# Patient Record
Sex: MALE | Race: WHITE | NOT HISPANIC OR LATINO | Employment: OTHER | URBAN - METROPOLITAN AREA
[De-identification: names, ages, dates, MRNs, and addresses within clinical notes are randomized per-mention and may not be internally consistent; named-entity substitution may affect disease eponyms.]

---

## 2017-01-02 ENCOUNTER — PRE VISIT (OUTPATIENT)
Dept: CARDIOLOGY | Facility: CLINIC | Age: 37
End: 2017-01-02

## 2017-01-04 ENCOUNTER — TELEPHONE (OUTPATIENT)
Dept: TRANSPLANT | Facility: CLINIC | Age: 37
End: 2017-01-04

## 2017-01-04 NOTE — TELEPHONE ENCOUNTER
Jonatan called to let me know he missed his appointment with Dr Calles this morning for his cardiac clearance. There was a family emergency. Jonatan spoke with Dr Calles's staff to reschedule and he will let me know when he has completed his visit.

## 2017-01-05 ENCOUNTER — TELEPHONE (OUTPATIENT)
Dept: CARDIOLOGY | Facility: CLINIC | Age: 37
End: 2017-01-05

## 2017-01-05 NOTE — TELEPHONE ENCOUNTER
----- Message from Crys Gerardo RN sent at 1/5/2017 11:51 AM CST -----  Regarding: FW: PT CALL BACK - VAN  Contact: [phone]  Can you find out what this margarito needs/wants.  I am in clinic al day at [facility].      THanks    Crys  ----- Message -----     From: Jonatan Nick     Sent: 1/4/2017   1:06 PM       To: Crys Gerardo RN  Subject: PT CALL BACK - VAN                          Pt called and would like a call back to discuss today's appointment, as well as getting another date to come in for an appointment.    Best call back: [phone]    Thanks,  DL    Please DO NOT send this message and/or reply back to sender.  Call Center Representatives DO NOT respond to messages.

## 2017-01-05 NOTE — TELEPHONE ENCOUNTER
Returned call to patient and left voice message encouraging him to return call and speak with a cardiology triage RN regarding his questions. Telephone number provided.

## 2017-03-23 ENCOUNTER — PRE VISIT (OUTPATIENT)
Dept: CARDIOLOGY | Facility: CLINIC | Age: 37
End: 2017-03-23

## 2017-04-04 ENCOUNTER — TELEPHONE (OUTPATIENT)
Dept: TRANSPLANT | Facility: CLINIC | Age: 37
End: 2017-04-04

## 2017-04-04 NOTE — TELEPHONE ENCOUNTER
Spoke to Jonatan about his missed appointments with Dr Calles. He missed his January and March appointments. He told me he had family emergencies. I asked him if this happened both times. He responded his kids were sick and he had no . He did say he knows Dr Calles is very busy. I asked him if he realized we are at a standstill and cannot move forward without him getting clearance from Dr Calles. He said he understood and said he would reschedule AND attend his appointment. He does have the cardiology scheduling number.

## 2017-05-10 ENCOUNTER — PRE VISIT (OUTPATIENT)
Dept: CARDIOLOGY | Facility: CLINIC | Age: 37
End: 2017-05-10

## 2017-06-05 ENCOUNTER — TELEPHONE (OUTPATIENT)
Dept: TRANSPLANT | Facility: CLINIC | Age: 37
End: 2017-06-05

## 2017-06-05 NOTE — TELEPHONE ENCOUNTER
Left message on mobile and home phone asking for a return call. Jonatan has missed his appointment with Dr Calles for the 3rd time. No call and no show. I am asking Jonatan what his plans are???

## 2017-06-19 ENCOUNTER — TELEPHONE (OUTPATIENT)
Dept: TRANSPLANT | Facility: CLINIC | Age: 37
End: 2017-06-19

## 2017-06-19 NOTE — TELEPHONE ENCOUNTER
Reached Jonatan and asked him what his plans are since he missed his appointment with Dr Calles for the 3rd time. He said he called them yesterday which would have been Sunday to reschedule. I asked him why he keeps missing his appointments and told me he forgets to put his appointments in his phone as a reminder. In the past his excuse was he did not have a  for his children. I told him without Dr Calles's approval we cannot move forward. I asked him to call me when he gets this appointment rescheduled. I will give him one month and if he does not reschedule I will put him on the agenda to close his evaluation.

## 2017-06-19 NOTE — TELEPHONE ENCOUNTER
Jonatan called me back to tell me he has an appointment with Dr Calles on June 30. He promised to keep that appointment.

## 2017-06-27 ENCOUNTER — PRE VISIT (OUTPATIENT)
Dept: CARDIOLOGY | Facility: CLINIC | Age: 37
End: 2017-06-27

## 2017-06-27 DIAGNOSIS — R06.09 DYSPNEA ON EXERTION: Primary | ICD-10-CM

## 2017-06-27 NOTE — TELEPHONE ENCOUNTER
Pulmonary Hypertension Return Patient Form       Patient Name:  Jonatan Foy       Age: 36M [birth year]   Todays Provider: Stevan    Appt: 6/30/2017             PH Medications: N/A                  Todays Testing: Labs                  Patient Update: Pt was last seen on 11/16/16 with Dr. Calles.  Since that time he was scheduled to have a R/LHC with Angio.  He was scheduled for 3 different follow up appointments (1/4/17, 3/28, 5/24) which he No Showed to.  He has a history of collagen vascular disease, coagulopathy, liver disease, congenital heart disease, drug abuse, alcohol abuse, DVT or PE.             Recent Testing Prior to Appointment:        Date Test Results        12/21/2016 RHC RA: 16, 17, 16 PA: 60/27, 38 PVR: 224 (2.8)     RV: 58, 17 PAW: 24, 38, 23 COI: 6.5 (3.3)   12/21/2016 Angiogram/   Stress Test No Angiographic evidence of obstructive CAD.  LVEDP of 23 mmHg and no evidence of aortic stenosis.

## 2017-06-30 ENCOUNTER — OFFICE VISIT (OUTPATIENT)
Dept: CARDIOLOGY | Facility: CLINIC | Age: 37
End: 2017-06-30
Attending: INTERNAL MEDICINE
Payer: MEDICARE

## 2017-06-30 VITALS
HEART RATE: 71 BPM | HEIGHT: 67 IN | SYSTOLIC BLOOD PRESSURE: 122 MMHG | OXYGEN SATURATION: 98 % | BODY MASS INDEX: 28.28 KG/M2 | WEIGHT: 180.2 LBS | DIASTOLIC BLOOD PRESSURE: 67 MMHG

## 2017-06-30 DIAGNOSIS — I27.20 PULMONARY HYPERTENSION (H): Primary | ICD-10-CM

## 2017-06-30 DIAGNOSIS — R06.09 DYSPNEA ON EXERTION: ICD-10-CM

## 2017-06-30 LAB
ANION GAP SERPL CALCULATED.3IONS-SCNC: 9 MMOL/L (ref 3–14)
BUN SERPL-MCNC: 28 MG/DL (ref 7–30)
CALCIUM SERPL-MCNC: 9 MG/DL (ref 8.5–10.1)
CHLORIDE SERPL-SCNC: 98 MMOL/L (ref 94–109)
CO2 SERPL-SCNC: 29 MMOL/L (ref 20–32)
CREAT SERPL-MCNC: 6.12 MG/DL (ref 0.66–1.25)
ERYTHROCYTE [DISTWIDTH] IN BLOOD BY AUTOMATED COUNT: 14.8 % (ref 10–15)
GFR SERPL CREATININE-BSD FRML MDRD: 10 ML/MIN/1.7M2
GLUCOSE SERPL-MCNC: 134 MG/DL (ref 70–99)
HCT VFR BLD AUTO: 35.4 % (ref 40–53)
HGB BLD-MCNC: 11.5 G/DL (ref 13.3–17.7)
MCH RBC QN AUTO: 33.7 PG (ref 26.5–33)
MCHC RBC AUTO-ENTMCNC: 32.5 G/DL (ref 31.5–36.5)
MCV RBC AUTO: 104 FL (ref 78–100)
NT-PROBNP SERPL-MCNC: ABNORMAL PG/ML (ref 0–125)
PLATELET # BLD AUTO: 161 10E9/L (ref 150–450)
POTASSIUM SERPL-SCNC: 4.4 MMOL/L (ref 3.4–5.3)
RBC # BLD AUTO: 3.41 10E12/L (ref 4.4–5.9)
SODIUM SERPL-SCNC: 136 MMOL/L (ref 133–144)
WBC # BLD AUTO: 4.5 10E9/L (ref 4–11)

## 2017-06-30 PROCEDURE — 80048 BASIC METABOLIC PNL TOTAL CA: CPT | Performed by: INTERNAL MEDICINE

## 2017-06-30 PROCEDURE — 85027 COMPLETE CBC AUTOMATED: CPT | Performed by: INTERNAL MEDICINE

## 2017-06-30 PROCEDURE — 99213 OFFICE O/P EST LOW 20 MIN: CPT | Mod: ZF

## 2017-06-30 PROCEDURE — 99214 OFFICE O/P EST MOD 30 MIN: CPT | Mod: ZP | Performed by: INTERNAL MEDICINE

## 2017-06-30 PROCEDURE — 83880 ASSAY OF NATRIURETIC PEPTIDE: CPT | Performed by: INTERNAL MEDICINE

## 2017-06-30 PROCEDURE — 36415 COLL VENOUS BLD VENIPUNCTURE: CPT | Performed by: INTERNAL MEDICINE

## 2017-06-30 RX ORDER — LIDOCAINE 40 MG/G
CREAM TOPICAL
Status: CANCELLED | OUTPATIENT
Start: 2017-06-30

## 2017-06-30 ASSESSMENT — PAIN SCALES - GENERAL: PAINLEVEL: NO PAIN (0)

## 2017-06-30 NOTE — MR AVS SNAPSHOT
After Visit Summary   6/30/2017    Jonatan Forbes    MRN: [id number]           Patient Information     Date Of Birth          1980        Visit Information        Provider Department      6/30/2017 2:30 PM Roberto Calles MD [facility]        Today's Diagnoses     Pulmonary hypertension (H)    -  1      Care Instructions    You were seen at the [facility] clinic today.  You saw Dr. Calles  Here are your Instructions:    1.  Right heart cath at your convenience.  2.  Follow up after.      Dominga Hood RN  Nurse Care Coordinator  Office:  [phone] option #1, then #3 & ask for Dominga (nurse line)  Fax:  [phone]  After Hours:  [phone]  Appointments:  [phone] option #1, then option #1    Results for JONATAN FORBES (MRN [id number]) as of 6/30/2017 16:17   Ref. Range 6/30/2017 14:01   Sodium Latest Ref Range: 133 - 144 mmol/L 136   Potassium Latest Ref Range: 3.4 - 5.3 mmol/L 4.4   Chloride Latest Ref Range: 94 - 109 mmol/L 98   Carbon Dioxide Latest Ref Range: 20 - 32 mmol/L 29   Urea Nitrogen Latest Ref Range: 7 - 30 mg/dL 28   Creatinine Latest Ref Range: 0.66 - 1.25 mg/dL 6.12 (H)   GFR Estimate Latest Ref Range: >60 mL/min/1.7m2 10 (L)   GFR Estimate If Black Latest Ref Range: >60 mL/min/1.7m2 13 (L)   Calcium Latest Ref Range: 8.5 - 10.1 mg/dL 9.0   Anion Gap Latest Ref Range: 3 - 14 mmol/L 9   N-Terminal Pro Bnp Latest Ref Range: 0 - 125 pg/mL 32651 (H)   Glucose Latest Ref Range: 70 - 99 mg/dL 134 (H)   WBC Latest Ref Range: 4.0 - 11.0 10e9/L 4.5   Hemoglobin Latest Ref Range: 13.3 - 17.7 g/dL 11.5 (L)   Hematocrit Latest Ref Range: 40.0 - 53.0 % 35.4 (L)   Platelet Count Latest Ref Range: 150 - 450 10e9/L 161   RBC Count Latest Ref Range: 4.4 - 5.9 10e12/L 3.41 (L)   MCV Latest Ref Range: 78 - 100 fl 104 (H)   MCH Latest Ref Range: 26.5 - 33.0 pg 33.7 (H)   MCHC Latest Ref Range: 31.5 - 36.5 g/dL 32.5   RDW Latest Ref  Range: 10.0 - 15.0 % 14.8                       Follow-ups after your visit        Your next 10 appointments already scheduled     Jul 19, 2017  1:30 PM CDT   Procedure - 2.5 hour with U2A ROOM 9   [address] [facility] ([facility])    [address] [address]               Jul 19, 2017  2:30 PM CDT   Heart Cath Right Heart Cath with [id number]   [facility] [place]  Heart Cath Lab ([facility])    [address] [id number] [phone]            Aug 02, 2017 12:00 PM CDT   (Arrive by 11:45 AM)   RETURN PRIMARY PULMONARY with Roberto Calles MD   [facility] ([facility])    [address] [place] [id number] [phone]              Future tests that were ordered for you today     Open Future Orders        Priority Expected Expires Ordered    Heart Cath Right heart cath Routine  6/30/2018 6/30/2017            Who to contact     If you have questions or need follow up information about today's clinic visit or your schedule please contact [facility] directly at [phone].  Normal or non-critical lab and imaging results will be communicated to you by Briggohart, letter or phone within 4 business days after the clinic has received the results. If you do not hear from us within 7 days, please contact the clinic through Briggohart or phone. If you have a critical or abnormal lab result, we will notify you by phone as soon as possible.  Submit refill requests through [email or web address] or call your pharmacy and they will forward the refill request to us. Please allow 3 business days for your refill to be completed.          Additional Information About Your Visit        [email or web address] Information     [email or web address] gives you secure access to your electronic health record. If you see a primary care provider, you can also send messages to your care team and make  "appointments. If you have questions, please call your primary care clinic.  If you do not have a primary care provider, please call [phone] and they will assist you.        Care EveryWhere ID     This is your Care EveryWhere ID. This could be used by other organizations to access your [place] medical records  [id number]        Your Vitals Were     Pulse Height Pulse Oximetry BMI (Body Mass Index)          71 1.702 m (5' 7\") 98% 28.22 kg/m2         Blood Pressure from Last 3 Encounters:   06/30/17 122/67   12/21/16 (!) 184/98   11/16/16 152/83    Weight from Last 3 Encounters:   06/30/17 81.7 kg (180 lb 3.2 oz)   11/16/16 86.2 kg (190 lb)   06/24/15 84.2 kg (185 lb 9.6 oz)               Primary Care Provider    Physician No Ref-Primary       No address on file        Equal Access to Services     ROSY BRAY : Hadii salomon yanceyo Somaki, waaxda luqadaha, qaybta kaalmada adeegyada, anuel mcdonough . So [facility] [phone].    ATENCIÓN: Si habla español, tiene a mcarthur disposición servicios gratuitos de asistencia lingüística. Meg al [phone].    We comply with applicable federal civil rights laws and [place] laws. We do not discriminate on the basis of race, color, national origin, age, disability sex, sexual orientation or gender identity.            Thank you!     Thank you for choosing [facility]  for your care. Our goal is always to provide you with excellent care. Hearing back from our patients is one way we can continue to improve our services. Please take a few minutes to complete the written survey that you may receive in the mail after your visit with us. Thank you!             Your Updated Medication List - Protect others around you: Learn how to safely use, store and throw away your medicines at www.disposemymeds.org.          This list is accurate as of: 6/30/17  4:25 PM.  Always use your most recent med list.                   Brand Name Dispense Instructions " for use Diagnosis    calcium acetate 667 MG Caps capsule    PHOSLO     Take 667 mg by mouth 3 times daily (with meals) Take 4 tabs with meals and 2 with snacks        lisinopril 40 MG tablet    PRINIVIL/ZESTRIL     Take 40 mg by mouth        metoprolol 100 MG tablet    LOPRESSOR     ONE TABLET TWICE DAILY        minoxidil 2.5 MG tablet    LONITEN     Take 2.5 mg by mouth 2 times daily        NIFEdipine ER 90 MG Tb24    ADALAT CC     Take 90 mg by mouth daily        oxyCODONE-acetaminophen 5-325 MG per tablet    PERCOCET     Take by mouth every 4 hours as needed for moderate to severe pain

## 2017-06-30 NOTE — PROGRESS NOTES
Problem List  1. CKD  2. Pulmonary hypertension with elevated LVEDP  3. ASHD without signficant obstruction  4. Hypertension  5. Anemia     History    Patient returns for follow-up.  Dry weight apparently reset.  No hypotension by report, otherwise feeling well.    Wt Readings from Last 24 Encounters:   06/30/17 81.7 kg (180 lb 3.2 oz)   11/16/16 86.2 kg (190 lb)   06/24/15 84.2 kg (185 lb 9.6 oz)   06/14/11 112.9 kg (249 lb)   10/13/10 115.6 kg (254 lb 14.4 oz)   10/04/10 115.2 kg (254 lb)   06/23/10 117.2 kg (258 lb 6.4 oz)   04/15/08 120.4 kg (265 lb 8 oz)   11/01/07 112.9 kg (249 lb)   10/12/07 116.1 kg (256 lb)     REVIEW of SYMPTOMS    Constitutional: without fever, chills, night sweats.  Weight is down  HEENT: without dry eyes, dry mouth, sinusitis, corryza, visual changes  Endocrine: without polyuria, polydypsia, polyphagia, heat or cold intolerance, changing mental status  Cardiology: without chest pain, tightness, heaviness, pressure, paroxysmal dyspnea, orthopnea, palpitation, pre-syncope or syncope or device discharge (if present)  Pulmonary: without asthma, wheezing, cough, hemoptysis  GI: without nausea, emesis, jaundice, pain, hematemesis, melena  : without frequency, urgency, hematuria, stones, pain, abnormal bleeding, frequency, urgency  Neurologic: without TIA, CVA, trauma, seizure  Dermatologic: without lesions, abrasion rash,   Orthopedic/Rheum: without significant joint pain, impairment, limb, polyserositis, ulceration, Raynauds  Heme: without mass, bruising, frequent infection, anemia  Psychiatric: without substance abuse, hallucination, medication, depression    Exercise tolerance:.     Objective   Constitutional: alert, oriented, normal gait and station, normal mentation.  Oral: moist mucous membrans  Lymph: without pathologic adenopathy  Chest: clear to ausculation and percussion  Cor: No evidence of left or right ventricular activity.  Rhythm is regular.  S1 normal, S2 split  physiologically. Murmurs are not present  Abdomen: without tenderness, rebound, guarding, masses, ascites  Extremities: Edema not present, fistula left arm, large with bruit  Neuro: no focal defects, normal mentation  Skin: without open lesions  Psych: oriented, verbal, mental status in tact      Wt Readings from Last 5 Encounters:   11/16/16 86.2 kg (190 lb)   06/24/15 84.2 kg (185 lb 9.6 oz)   06/14/11 112.9 kg (249 lb)   10/13/10 115.6 kg (254 lb 14.4 oz)   10/04/10 115.2 kg (254 lb)         Meds  Current Outpatient Prescriptions   Medication     minoxidil (LONITEN) 2.5 MG tablet     NIFEdipine (ADALAT CC) 90 MG TB24     lisinopril (PRINIVIL,ZESTRIL) 40 MG tablet     calcium acetate (PHOSLO) 667 MG CAPS     oxyCODONE-acetaminophen (PERCOCET) 5-325 MG per tablet     METOPROLOL TARTRATE 100 MG PO TABS      No current facility-administered medications for this visit.          Labs   Results for GABRIEL FORBES JONI (MRN [id number]) as of 6/30/2017 15:53   Ref. Range 12/21/2016 15:43 6/30/2017 14:01   Sodium Latest Ref Range: 133 - 144 mmol/L  136   Potassium Latest Ref Range: 3.4 - 5.3 mmol/L  4.4   Chloride Latest Ref Range: 94 - 109 mmol/L  98   Carbon Dioxide Latest Ref Range: 20 - 32 mmol/L  29   Urea Nitrogen Latest Ref Range: 7 - 30 mg/dL  28   Creatinine Latest Ref Range: 0.66 - 1.25 mg/dL  6.12 (H)   GFR Estimate Latest Ref Range: >60 mL/min/1.7m2  10 (L)   GFR Estimate If Black Latest Ref Range: >60 mL/min/1.7m2  13 (L)   Calcium Latest Ref Range: 8.5 - 10.1 mg/dL  9.0   Anion Gap Latest Ref Range: 3 - 14 mmol/L  9   N-Terminal Pro Bnp Latest Ref Range: 0 - 125 pg/mL  35895 (H)   Glucose Latest Ref Range: 70 - 99 mg/dL  134 (H)   WBC Latest Ref Range: 4.0 - 11.0 10e9/L  4.5   Hemoglobin Latest Ref Range: 13.3 - 17.7 g/dL  11.5 (L)   Hematocrit Latest Ref Range: 40.0 - 53.0 %  35.4 (L)   Platelet Count Latest Ref Range: 150 - 450 10e9/L  161   RBC Count Latest Ref Range: 4.4 - 5.9 10e12/L  3.41 (L)   MCV Latest  Ref Range: 78 - 100 fl  104 (H)   MCH Latest Ref Range: 26.5 - 33.0 pg  33.7 (H)   MCHC Latest Ref Range: 31.5 - 36.5 g/dL  32.5   RDW Latest Ref Range: 10.0 - 15.0 %  14.8        Imaging      HEMODYNAMICS:  --HR 78 bpm  --Ao /74/85 mmHg      RIGHT HEART CATHETERIZATION:  BSA 2.03m2  --RA 16/17/16    --RV 58/17  --PA 60/27/38    --PCW 24/38/23    --PA sat 68.4%   --PCW 92.1%  --Arterial sat 98%  --Hgb 9.7 g/dL    --Elizabeht CO 6.5    --Elizabeth CI 3.2    --TD CO 13.3  (moderate TR on TTE)  --TD CI 6.7  (moderate TR on TTE)  --PVR 2.8  --      CORONARY ANGIOGRAM:    --Both coronary arteries arise from their respective cusps.  --Right dominant.  --LM is large without any angiographic evidence of disease.  --LAD is a large type 3 vessel giving rise to septal perforators, a large D1 and small D2 and D3.  There is no angiographic evidence of disease in any segment.  --LCX is a large vessel giving rise to small OM1 and large OM2 and OM3 vessels.  The pLCx has a moderate focal calcification with diffuse minimal luminal irregularities.  The mLCx, dLCx, OM2 and OM3 have no angiographic evidence of disease.     --RCA is a large vessel giving rise to large RPDA and RPL branches.  There is no angiographic evidence of disease.        LEFT HEART CATHETERIZATION:  --LVEDP 23 mmHg.  --No gradient across the aortic valve on pull back.  --Simultaneous pressures with the RV and LV demonstrated concordance in respiratory variation      EXAMINATION: CT ABDOMEN PELVIS W CONTRAST, 7/22/2016 10:41 AM      TECHNIQUE:  Helical CT images from the lung bases through the  symphysis pubis were obtained with IV contrast. Contrast dose: Isovue  370 114cc      COMPARISON: No prior studies available for comparison.      HISTORY: re-assess  mildly enlarged periaortic nodes and left external  iliac chain nodes seen on 2014 outside abominal pelvis CAT scan,  Awaiting organ transplant status, End stage renal disease. History of  failed renal  transplant.      FINDINGS:      Abdomen and pelvis: Postsurgical changes are noted in the left lower  quadrant related to prior renal transplantation. There is a  well-defined hypodense structure measuring 5.2 x 6 cm in the left  lower quadrant medial to the external iliac vasculature demonstrating  internal calcifications, consistent with nonfunctioning transplanted  kidney. Both native kidneys are atrophic with nonobstructing  nephrolithiasis.  The liver demonstrates homogeneous parenchyma without focal lesion.  Spleen, pancreas and both adrenal glands are unremarkable. The  gallbladder is contracted without gallstones or pericholecystic fluid.  No bowel wall thickening or obstruction. Urinary bladder is collapsed.  Multiple mildly enlarged lymph nodes are noted in the retroperitoneum  in a para-aortic location and in the mesentery and pelvis. For example  there is a left para-aortic lymph node measuring 11.5 cm (image 41,  series 2). There is a left common iliac lymph node measuring 1.7 x 1.5  cm (image 51, series 2). Mildly enlarged bilateral inguinal lymph  nodes are also noted, largest measuring 2.5 x 1.3 cm on the left.  Major abdominal vasculature is patent. No free fluid in the abdomen or  pelvis.  Partially visualized  shunt coiled in the lower abdomen.      Lung bases: Minimal right basilar atelectasis.      Bones and soft tissues: Lytic focus in the right iliac bone (series 2  image 54).  Diffuse mild increase density of the osseous structures  consistent with renal osteodystrophy. Multilevel degenerative changes  are noted in the spine.  Small fat-containing umbilical hernia.          IMPRESSION:    1. Nonfunctioning renal transplant in the left lower quadrant with  atrophic native kidneys.  2. Multiple mildly enlarged lymph nodes in the retroperitoneum,  mesentery and pelvis.  These could be reactive or related to a  lymphoproliferative disorder such as PTLD.  Currently, previous CT  scans are  unavailable for comparison, including the 2014 outside  abdomen and pelvis CT mentioned in the procedure comments.  If this  study is made available, an addendum documenting stability could be  provided.  3.  Small, non-aggressive appearing lytic focus in the right iliac  bone, of uncertain clinical significance.  Recommend comparison with  outside films.  4.  Diffuse sclerosis of the bones, and possibly related to renal  Osteodystrophy.             Name: GABRIEL FORBES  MRN: [id number]  : 1980  Study Date: 2016 11:07 AM  Age: 36 yrs  Gender: Male  Patient Location: [facility]  Reason For Study:     Ordering Physician: TYSON SANTACRUZ  Referring Physician: TYSON SANTACRUZ  Performed By: Shanel Rubin RDCS     BSA: 2.0 m2  Height: 67 in  Weight: 185 lb  HR: 73  BP: 151/85 mmHg  ______________________________________________________________________________        Procedure  Echocardiogram with two-dimensional, color and spectral Doppler performed.  ______________________________________________________________________________     Interpretation Summary  Global and regional left ventricular function is normal with an EF of 60-65%.  Mild concentric wall thickening consistent with left ventricular hypertrophy  is present.  Right ventricular function, chamber size, wall motion, and thickness are  normal.  Moderate pulmonary hypertension is present.  No significant change from prior study.    Assessment:  The heart catherization in December demonstrated pulmonary venous hypertension suggesting the patient was volume overloaded and would potential respond to resetting of dry weight per renal and renal transplant.  He should have repeat right heart catherization after dry weight reset.  The other option is to admit with dialysis daily until PCP <10-12.  Renal to decide.  Patient states that dry weight has been reset.  Should have repeat if now at dry weight as continued PH in context of renal disease is very poor  prognostic marker.      Discussed with patient.    CC: [facility] [place]

## 2017-06-30 NOTE — LETTER
6/30/2017      RE: Jonatan Foy  [address] [facility] [address]       Dear Colleague,    Thank you for the opportunity to participate in the care of your patient, Jonatan Foy, at the [facility] at [facility]. Please see a copy of my visit note below.          Problem List  1. CKD  2. Pulmonary hypertension with elevated LVEDP  3. ASHD without signficant obstruction  4. Hypertension  5. Anemia     History    Patient returns for follow-up.  Dry weight apparently reset.  No hypotension by report, otherwise feeling well.    Wt Readings from Last 24 Encounters:   06/30/17 81.7 kg (180 lb 3.2 oz)   11/16/16 86.2 kg (190 lb)   06/24/15 84.2 kg (185 lb 9.6 oz)   06/14/11 112.9 kg (249 lb)   10/13/10 115.6 kg (254 lb 14.4 oz)   10/04/10 115.2 kg (254 lb)   06/23/10 117.2 kg (258 lb 6.4 oz)   04/15/08 120.4 kg (265 lb 8 oz)   11/01/07 112.9 kg (249 lb)   10/12/07 116.1 kg (256 lb)     REVIEW of SYMPTOMS    Constitutional: without fever, chills, night sweats.  Weight is down  HEENT: without dry eyes, dry mouth, sinusitis, corryza, visual changes  Endocrine: without polyuria, polydypsia, polyphagia, heat or cold intolerance, changing mental status  Cardiology: without chest pain, tightness, heaviness, pressure, paroxysmal dyspnea, orthopnea, palpitation, pre-syncope or syncope or device discharge (if present)  Pulmonary: without asthma, wheezing, cough, hemoptysis  GI: without nausea, emesis, jaundice, pain, hematemesis, melena  : without frequency, urgency, hematuria, stones, pain, abnormal bleeding, frequency, urgency  Neurologic: without TIA, CVA, trauma, seizure  Dermatologic: without lesions, abrasion rash,   Orthopedic/Rheum: without significant joint pain, impairment, limb, polyserositis, ulceration, Raynauds  Heme: without mass, bruising, frequent infection, anemia  Psychiatric: without substance abuse, hallucination, medication, depression    Exercise  tolerance:.     Objective   Constitutional: alert, oriented, normal gait and station, normal mentation.  Oral: moist mucous membrans  Lymph: without pathologic adenopathy  Chest: clear to ausculation and percussion  Cor: No evidence of left or right ventricular activity.  Rhythm is regular.  S1 normal, S2 split physiologically. Murmurs are not present  Abdomen: without tenderness, rebound, guarding, masses, ascites  Extremities: Edema not present, fistula left arm, large with bruit  Neuro: no focal defects, normal mentation  Skin: without open lesions  Psych: oriented, verbal, mental status in tact      Wt Readings from Last 5 Encounters:   11/16/16 86.2 kg (190 lb)   06/24/15 84.2 kg (185 lb 9.6 oz)   06/14/11 112.9 kg (249 lb)   10/13/10 115.6 kg (254 lb 14.4 oz)   10/04/10 115.2 kg (254 lb)         Meds      Current Outpatient Prescriptions   Medication     minoxidil (LONITEN) 2.5 MG tablet     NIFEdipine (ADALAT CC) 90 MG TB24     lisinopril (PRINIVIL,ZESTRIL) 40 MG tablet     calcium acetate (PHOSLO) 667 MG CAPS     oxyCODONE-acetaminophen (PERCOCET) 5-325 MG per tablet     METOPROLOL TARTRATE 100 MG PO TABS      No current facility-administered medications for this visit.          Labs   Results for GABRIEL FORBES JONI (MRN [id number]) as of 6/30/2017 15:53   Ref. Range 12/21/2016 15:43 6/30/2017 14:01   Sodium Latest Ref Range: 133 - 144 mmol/L  136   Potassium Latest Ref Range: 3.4 - 5.3 mmol/L  4.4   Chloride Latest Ref Range: 94 - 109 mmol/L  98   Carbon Dioxide Latest Ref Range: 20 - 32 mmol/L  29   Urea Nitrogen Latest Ref Range: 7 - 30 mg/dL  28   Creatinine Latest Ref Range: 0.66 - 1.25 mg/dL  6.12 (H)   GFR Estimate Latest Ref Range: >60 mL/min/1.7m2  10 (L)   GFR Estimate If Black Latest Ref Range: >60 mL/min/1.7m2  13 (L)   Calcium Latest Ref Range: 8.5 - 10.1 mg/dL  9.0   Anion Gap Latest Ref Range: 3 - 14 mmol/L  9   N-Terminal Pro Bnp Latest Ref Range: 0 - 125 pg/mL  96688 (H)   Glucose Latest Ref  Range: 70 - 99 mg/dL  134 (H)   WBC Latest Ref Range: 4.0 - 11.0 10e9/L  4.5   Hemoglobin Latest Ref Range: 13.3 - 17.7 g/dL  11.5 (L)   Hematocrit Latest Ref Range: 40.0 - 53.0 %  35.4 (L)   Platelet Count Latest Ref Range: 150 - 450 10e9/L  161   RBC Count Latest Ref Range: 4.4 - 5.9 10e12/L  3.41 (L)   MCV Latest Ref Range: 78 - 100 fl  104 (H)   MCH Latest Ref Range: 26.5 - 33.0 pg  33.7 (H)   MCHC Latest Ref Range: 31.5 - 36.5 g/dL  32.5   RDW Latest Ref Range: 10.0 - 15.0 %  14.8        Imaging      HEMODYNAMICS:  --HR 78 bpm  --Ao /74/85 mmHg      RIGHT HEART CATHETERIZATION:  BSA 2.03m2  --RA 16/17/16    --RV 58/17  --PA 60/27/38    --PCW 24/38/23    --PA sat 68.4%   --PCW 92.1%  --Arterial sat 98%  --Hgb 9.7 g/dL    --Elizabeth CO 6.5    --Elizabeth CI 3.2    --TD CO 13.3  (moderate TR on TTE)  --TD CI 6.7  (moderate TR on TTE)  --PVR 2.8  --      CORONARY ANGIOGRAM:    --Both coronary arteries arise from their respective cusps.  --Right dominant.  --LM is large without any angiographic evidence of disease.  --LAD is a large type 3 vessel giving rise to septal perforators, a large D1 and small D2 and D3.  There is no angiographic evidence of disease in any segment.  --LCX is a large vessel giving rise to small OM1 and large OM2 and OM3 vessels.  The pLCx has a moderate focal calcification with diffuse minimal luminal irregularities.  The mLCx, dLCx, OM2 and OM3 have no angiographic evidence of disease.     --RCA is a large vessel giving rise to large RPDA and RPL branches.  There is no angiographic evidence of disease.        LEFT HEART CATHETERIZATION:  --LVEDP 23 mmHg.  --No gradient across the aortic valve on pull back.  --Simultaneous pressures with the RV and LV demonstrated concordance in respiratory variation      EXAMINATION: CT ABDOMEN PELVIS W CONTRAST, 7/22/2016 10:41 AM      TECHNIQUE:  Helical CT images from the lung bases through the  symphysis pubis were obtained with IV contrast. Contrast  dose: Isovue  370 114cc      COMPARISON: No prior studies available for comparison.      HISTORY: re-assess  mildly enlarged periaortic nodes and left external  iliac chain nodes seen on 2014 outside abominal pelvis CAT scan,  Awaiting organ transplant status, End stage renal disease. History of  failed renal transplant.      FINDINGS:      Abdomen and pelvis: Postsurgical changes are noted in the left lower  quadrant related to prior renal transplantation. There is a  well-defined hypodense structure measuring 5.2 x 6 cm in the left  lower quadrant medial to the external iliac vasculature demonstrating  internal calcifications, consistent with nonfunctioning transplanted  kidney. Both native kidneys are atrophic with nonobstructing  nephrolithiasis.  The liver demonstrates homogeneous parenchyma without focal lesion.  Spleen, pancreas and both adrenal glands are unremarkable. The  gallbladder is contracted without gallstones or pericholecystic fluid.  No bowel wall thickening or obstruction. Urinary bladder is collapsed.  Multiple mildly enlarged lymph nodes are noted in the retroperitoneum  in a para-aortic location and in the mesentery and pelvis. For example  there is a left para-aortic lymph node measuring 11.5 cm (image 41,  series 2). There is a left common iliac lymph node measuring 1.7 x 1.5  cm (image 51, series 2). Mildly enlarged bilateral inguinal lymph  nodes are also noted, largest measuring 2.5 x 1.3 cm on the left.  Major abdominal vasculature is patent. No free fluid in the abdomen or  pelvis.  Partially visualized  shunt coiled in the lower abdomen.      Lung bases: Minimal right basilar atelectasis.      Bones and soft tissues: Lytic focus in the right iliac bone (series 2  image 54).  Diffuse mild increase density of the osseous structures  consistent with renal osteodystrophy. Multilevel degenerative changes  are noted in the spine.  Small fat-containing umbilical hernia.          IMPRESSION:     1. Nonfunctioning renal transplant in the left lower quadrant with  atrophic native kidneys.  2. Multiple mildly enlarged lymph nodes in the retroperitoneum,  mesentery and pelvis.  These could be reactive or related to a  lymphoproliferative disorder such as PTLD.  Currently, previous CT  scans are unavailable for comparison, including the  outside  abdomen and pelvis CT mentioned in the procedure comments.  If this  study is made available, an addendum documenting stability could be  provided.  3.  Small, non-aggressive appearing lytic focus in the right iliac  bone, of uncertain clinical significance.  Recommend comparison with  outside films.  4.  Diffuse sclerosis of the bones, and possibly related to renal  Osteodystrophy.             Name: GABRIEL FORBES  MRN: [id number]  : 1980  Study Date: 2016 11:07 AM  Age: 36 yrs  Gender: Male  Patient Location: [facility] [place] [facility]  Reason For Study:     Ordering Physician: TYSON SANTACRUZ  Referring Physician: TYSON SANTACRUZ  Performed By: Shanel Rubin RDCS     BSA: 2.0 m2  Height: 67 in  Weight: 185 lb  HR: 73  BP: 151/85 mmHg  ______________________________________________________________________________        Procedure  Echocardiogram with two-dimensional, color and spectral Doppler performed.  ______________________________________________________________________________     Interpretation Summary  Global and regional left ventricular function is normal with an EF of 60-65%.  Mild concentric wall thickening consistent with left ventricular hypertrophy  is present.  Right ventricular function, chamber size, wall motion, and thickness are  normal.  Moderate pulmonary hypertension is present.  No significant change from prior study.    Assessment:  The heart catherization in December demonstrated pulmonary venous hypertension suggesting the patient was volume overloaded and would potential respond to resetting of dry weight per renal and renal transplant.  He  should have repeat right heart catherization after dry weight reset.  The other option is to admit with dialysis daily until PCP <10-12.  Renal to decide.  Patient states that dry weight has been reset.  Should have repeat if now at dry weight as continued PH in context of renal disease is very poor prognostic marker.      Discussed with patient.     Roberto Calles MD      CC: [facility] [place]

## 2017-06-30 NOTE — PATIENT INSTRUCTIONS
You were seen at the [facility] clinic today.  You saw Dr. Calles  Here are your Instructions:    1.  Right heart cath at your convenience.  2.  Follow up after.      Dominga Hood RN  Nurse Care Coordinator  Office:  [phone] option #1, then #3 & ask for Dominga (nurse line)  Fax:  [phone]  After Hours:  [phone]  Appointments:  [phone] option #1, then option #1    Results for GABRIEL FORBES (MRN [id number]) as of 6/30/2017 16:17   Ref. Range 6/30/2017 14:01   Sodium Latest Ref Range: 133 - 144 mmol/L 136   Potassium Latest Ref Range: 3.4 - 5.3 mmol/L 4.4   Chloride Latest Ref Range: 94 - 109 mmol/L 98   Carbon Dioxide Latest Ref Range: 20 - 32 mmol/L 29   Urea Nitrogen Latest Ref Range: 7 - 30 mg/dL 28   Creatinine Latest Ref Range: 0.66 - 1.25 mg/dL 6.12 (H)   GFR Estimate Latest Ref Range: >60 mL/min/1.7m2 10 (L)   GFR Estimate If Black Latest Ref Range: >60 mL/min/1.7m2 13 (L)   Calcium Latest Ref Range: 8.5 - 10.1 mg/dL 9.0   Anion Gap Latest Ref Range: 3 - 14 mmol/L 9   N-Terminal Pro Bnp Latest Ref Range: 0 - 125 pg/mL 96521 (H)   Glucose Latest Ref Range: 70 - 99 mg/dL 134 (H)   WBC Latest Ref Range: 4.0 - 11.0 10e9/L 4.5   Hemoglobin Latest Ref Range: 13.3 - 17.7 g/dL 11.5 (L)   Hematocrit Latest Ref Range: 40.0 - 53.0 % 35.4 (L)   Platelet Count Latest Ref Range: 150 - 450 10e9/L 161   RBC Count Latest Ref Range: 4.4 - 5.9 10e12/L 3.41 (L)   MCV Latest Ref Range: 78 - 100 fl 104 (H)   MCH Latest Ref Range: 26.5 - 33.0 pg 33.7 (H)   MCHC Latest Ref Range: 31.5 - 36.5 g/dL 32.5   RDW Latest Ref Range: 10.0 - 15.0 % 14.8

## 2017-06-30 NOTE — NURSING NOTE
Chief Complaint   Patient presents with     Follow Up For     Return for PH F/U with Labs prior     Vitals were taken and medications were reconciled.     Garth Kuo, SHAVON  2:09 PM

## 2017-07-19 ENCOUNTER — APPOINTMENT (OUTPATIENT)
Dept: MEDSURG UNIT | Facility: CLINIC | Age: 37
End: 2017-07-19
Attending: INTERNAL MEDICINE
Payer: MEDICARE

## 2017-07-19 ENCOUNTER — APPOINTMENT (OUTPATIENT)
Dept: CARDIOLOGY | Facility: CLINIC | Age: 37
End: 2017-07-19
Attending: INTERNAL MEDICINE
Payer: MEDICARE

## 2017-07-19 ENCOUNTER — HOSPITAL ENCOUNTER (OUTPATIENT)
Facility: CLINIC | Age: 37
Discharge: HOME | End: 2017-07-19
Attending: INTERNAL MEDICINE | Admitting: INTERNAL MEDICINE
Payer: MEDICARE

## 2017-07-19 VITALS
TEMPERATURE: 98 F | DIASTOLIC BLOOD PRESSURE: 104 MMHG | HEIGHT: 67 IN | HEART RATE: 108 BPM | BODY MASS INDEX: 27.94 KG/M2 | RESPIRATION RATE: 20 BRPM | WEIGHT: 178 LBS | SYSTOLIC BLOOD PRESSURE: 178 MMHG | OXYGEN SATURATION: 100 %

## 2017-07-19 DIAGNOSIS — I27.20 PULMONARY HYPERTENSION (H): ICD-10-CM

## 2017-07-19 PROCEDURE — 93451 RIGHT HEART CATH: CPT

## 2017-07-19 PROCEDURE — 27210982 ZZH KIT RT HC TOTES DISP CR7

## 2017-07-19 PROCEDURE — 27211181 ZZH BALLOON TIP PRESSURE CR5

## 2017-07-19 PROCEDURE — 40000166 ZZH STATISTIC PP CARE STAGE 1

## 2017-07-19 PROCEDURE — 27210788 ZZH MANIFOLD CR3

## 2017-07-19 PROCEDURE — 27210806 ZZH SHEATH CR5

## 2017-07-19 PROCEDURE — 93451 RIGHT HEART CATH: CPT | Mod: 26 | Performed by: INTERNAL MEDICINE

## 2017-07-19 PROCEDURE — 4A023N6 MEASUREMENT OF CARDIAC SAMPLING AND PRESSURE, RIGHT HEART, PERCUTANEOUS APPROACH: ICD-10-PCS | Performed by: INTERNAL MEDICINE

## 2017-07-19 RX ORDER — LIDOCAINE 40 MG/G
CREAM TOPICAL
Status: COMPLETED | OUTPATIENT
Start: 2017-07-19 | End: 2017-07-19

## 2017-07-19 RX ADMIN — LIDOCAINE: 40 CREAM TOPICAL at 14:16

## 2017-07-19 NOTE — IP AVS SNAPSHOT
MRN:[id number]                      After Visit Summary   7/19/2017    Jonatan Foy    MRN: [id number]           Visit Information        Department      7/19/2017  1:16 PM [facility], [place],  Heart Cath Lab          Review of your medicines      UNREVIEWED medicines. Ask your doctor about these medicines        Dose / Directions    lisinopril 40 MG tablet   Commonly known as:  PRINIVIL/ZESTRIL        Dose:  40 mg   Take 40 mg by mouth   Refills:  0       metoprolol 100 MG tablet   Commonly known as:  LOPRESSOR        ONE TABLET TWICE DAILY   Refills:  3       minoxidil 2.5 MG tablet   Commonly known as:  LONITEN        Dose:  2.5 mg   Take 2.5 mg by mouth 2 times daily   Refills:  0       NIFEdipine ER 90 MG Tb24   Commonly known as:  ADALAT CC        Dose:  90 mg   Take 90 mg by mouth daily   Refills:  0       oxyCODONE-acetaminophen 5-325 MG per tablet   Commonly known as:  PERCOCET        Take by mouth every 4 hours as needed for moderate to severe pain   Refills:  0                Protect others around you: Learn how to safely use, store and throw away your medicines at www.disposemymeds.org.         Follow-ups after your visit        Your next 10 appointments already scheduled     Aug 02, 2017 12:00 PM CDT   (Arrive by 11:45 AM)   RETURN PRIMARY PULMONARY with Roberto Calles MD   [facility] ([facility]    [address] [place] [id number] [phone]               Care Instructions        Further instructions from your care team       [facility]                        Interventional Cardiology  Discharge Instructions   Post Right Heart Cath      AFTER YOU GO HOME:    DO drink plenty of fluids    DO resume your regular diet and medications unless otherwise instructed by your Primary Physician    Do Not scrub the procedure site vigorously    No lotion or powder to the puncture site for 3 days    CALL YOUR  PRIMARY PHYSICIAN IF: You may resume all normal activity.  Monitor neck site for bleeding, swelling, or voice changes. If you notice bleeding or swelling immediately apply pressure to the site and call number below to speak with Cardiology Fellow.  If you experience any changes in your breathing you should call your doctor immediately or come to the closest Emergency Department.  Do not drive yourself.    ADDITIONAL INSTRUCTIONS: Medications: You are to resume all home medications including anticoagulation therapy unless otherwise advised by your primary cardiologist or nurse coordinator.    Follow Up: Per your primary cardiology team    If you have any questions or concerns regarding your procedure site please call [phone] at anytime and ask for Cardiology Fellow on call.  They are available 24 hours a day.  You may also contact the Cardiology Clinic after hours number at [phone].                                                       Telephone Numbers [phone] Monday-Friday 8:00 am to 4:30 pm    [phone] After 4:30 pm Monday-Friday, Weekends & Holidays  Ask for Interventional Cardiologist on call. Someone is on call 24 hours/day   [facility] toll free number [phone] Monday-Friday 8:00 am to 4:30 pm   [facility] Emergency Dept [phone]                    Additional Information About Your Visit        [employer] Information     [employer] gives you secure access to your electronic health record. If you see a primary care provider, you can also send messages to your care team and make appointments. If you have questions, please call your primary care clinic.  If you do not have a primary care provider, please call [phone] and they will assist you.        Care EveryWhere ID     This is your Care EveryWhere ID. This could be used by other organizations to access your Portal medical records  [id number]        Your Vitals Were     Blood Pressure Pulse Temperature Respirations Height  "Weight    158/94 (BP Location: Right arm) 80 97.8  F (36.6  C) (Oral) 20 1.702 m (5' 7\") 80.7 kg (178 lb)    Pulse Oximetry BMI (Body Mass Index)                100% 27.88 kg/m2           Primary Care Provider    Physician No Ref-Primary      Equal Access to Services     MAXIMINOPATRICIA KATARINA : Hadii aad ku hadasho Soomaali, waaxda luqadaha, qaybta kaalmada adeegyada, waxjosé nitinin hayaan adewilbert aguilar laheidilianne . So [facility] [phone].    ATENCIÓN: Si habla español, tiene a mcarthur disposición servicios gratuitos de asistencia lingüística. Llame al [phone].    We comply with applicable federal civil rights laws and [place] laws. We do not discriminate on the basis of race, color, national origin, age, disability sex, sexual orientation or gender identity.            Thank you!     Thank you for choosing [facility] for your care. Our goal is always to provide you with excellent care. Hearing back from our patients is one way we can continue to improve our services. Please take a few minutes to complete the written survey that you may receive in the mail after you visit with us. Thank you!             Medication List: This is a list of all your medications and when to take them. Check marks below indicate your daily home schedule. Keep this list as a reference.      Medications           Morning Afternoon Evening Bedtime As Needed    lisinopril 40 MG tablet   Commonly known as:  PRINIVIL/ZESTRIL   Take 40 mg by mouth                                metoprolol 100 MG tablet   Commonly known as:  LOPRESSOR   ONE TABLET TWICE DAILY                                minoxidil 2.5 MG tablet   Commonly known as:  LONITEN   Take 2.5 mg by mouth 2 times daily                                NIFEdipine ER 90 MG Tb24   Commonly known as:  ADALAT CC   Take 90 mg by mouth daily                                oxyCODONE-acetaminophen 5-325 MG per tablet   Commonly known as:  PERCOCET   Take by mouth every 4 hours as needed for moderate to severe " pain

## 2017-07-19 NOTE — IP AVS SNAPSHOT
[facility], [facility],  Heart Cath Lab    [address] [facility] [address]    Phone:  [phone]                                       After Visit Summary   7/19/2017    Jonatan Foy    MRN: [id number]           After Visit Summary Signature Page     I have received my discharge instructions, and my questions have been answered. I have discussed any challenges I see with this plan with the nurse or doctor.    ..........................................................................................................................................  Patient/Patient Representative Signature      ..........................................................................................................................................  Patient Representative Print Name and Relationship to Patient    ..................................................               ................................................  Date                                            Time    ..........................................................................................................................................  Reviewed by Signature/Title    ...................................................              ..............................................  Date                                                            Time

## 2017-07-19 NOTE — PROCEDURES
[facility]  CARDIOLOGY   Right Heart Catheterization   July 19, 2017    Attending Cardiology Staff: Demetrius Romo MD  Cardiology Fellow: Andres Mistry MD    History: 36 year-old male with ESRD on dialysis and hypertension, here for hemodynamic evaluation with right heart catheterization.  After informing the benefits and risks, an informed consent was obtained period. Right neck was prepped and draped in the usual sterile fashion and infiltrated with a 2% lidocaine. 7 Fr sheath was placed in the right internal jugular vein using modified Seldinger technique over a micropuncture wire. RHC was performed using a 7 Fr. Edwards Hector catheter. Intracardiac pressures, oxygen saturation, and cardiac output were obtained.      Procedure: Right Heart Cathterization  Access: 7 Fr sheath in RIJ obtained without complications   Findings   RA: 14/14/11  RV 50/11  PA 50/22/35  PCWP 22/40/20  Elizabeth CO 14.7 (7.66) TD CO 13.0 (6.8)   TPR 2.7 PVR 1.2  PaSat 81.6% Hb 9.2  PCW 96.7    Condition 2  Attempted to occlude patient's LUE fistula with blood pressure cuff, but due to pain his blood pressure increased leading to continued elevated cardiac output.    Complications: None   Blood loss: Minimal blood loss    Conclusions:  1. 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  2. Mildly elevated pulmonary artery pressures.  3. High output cardiac output.    Recommendations:  1. Continued medical management for cardiovascular risk factor optimization.  2. Results have been communicated to referring provider, Dr. Calles. Plan per primary provider.      Andres Mistry MD  Advanced Heart Failure/Heart Transplant Fellow  [facility] Division of Cardiology

## 2017-07-19 NOTE — PROGRESS NOTES
Patient is ready for the [facility], EMLA cream to the right side of neck. Has low back pain but this is not new.

## 2017-07-19 NOTE — DISCHARGE INSTRUCTIONS
[facility]                        Interventional Cardiology  Discharge Instructions   Post Right Heart Cath      AFTER YOU GO HOME:    DO drink plenty of fluids    DO resume your regular diet and medications unless otherwise instructed by your Primary Physician    Do Not scrub the procedure site vigorously    No lotion or powder to the puncture site for 3 days    CALL YOUR PRIMARY PHYSICIAN IF: You may resume all normal activity.  Monitor neck site for bleeding, swelling, or voice changes. If you notice bleeding or swelling immediately apply pressure to the site and call number below to speak with Cardiology Fellow.  If you experience any changes in your breathing you should call your doctor immediately or come to the closest Emergency Department.  Do not drive yourself.    ADDITIONAL INSTRUCTIONS: Medications: You are to resume all home medications including anticoagulation therapy unless otherwise advised by your primary cardiologist or nurse coordinator.    Follow Up: Per your primary cardiology team    If you have any questions or concerns regarding your procedure site please call [phone] at anytime and ask for Cardiology Fellow on call.  They are available 24 hours a day.  You may also contact the Cardiology Clinic after hours number at [phone].                                                       Telephone Numbers [phone] Monday-Friday 8:00 am to 4:30 pm    [phone] After 4:30 pm Monday-Friday, Weekends & Holidays  Ask for Interventional Cardiologist on call. Someone is on call 24 hours/day   [facility] toll free number [phone] Monday-Friday 8:00 am to 4:30 pm   [facility] Emergency Dept [phone]

## 2017-07-19 NOTE — PROGRESS NOTES
Patient tolerated recovery stage well. VSS, RIJV site clean/dry/intact, no hematoma, and denies pain. Patient tolerated PO food and fluids. Teaching was done and discharge instructions were given. Patient ambulated. Patient discharged from the hospital viaambulatory  to home .

## 2017-07-31 ENCOUNTER — PRE VISIT (OUTPATIENT)
Dept: CARDIOLOGY | Facility: CLINIC | Age: 37
End: 2017-07-31

## 2017-07-31 ENCOUNTER — TELEPHONE (OUTPATIENT)
Dept: TRANSPLANT | Facility: CLINIC | Age: 37
End: 2017-07-31

## 2017-07-31 NOTE — TELEPHONE ENCOUNTER
Jonatan called me to double check on his appointment scheduled for tomorrow with Dr Calles at 12:00. Confirmed time.

## 2017-07-31 NOTE — LETTER
August 23, 2017      RE:  Jonatan Foy, 1980      Please fax to our office the above mutual patient's most recent 6 months Attendance Run Records.      Thank you in advance for your assistance in this matter.        Janee Clark LPN -  Kidney Transplant Wait List  [phone] Phone  [phone] Fax  [email or web address]     [place] [address]

## 2017-07-31 NOTE — TELEPHONE ENCOUNTER
Pulmonary Hypertension Return Patient Form       Patient Name: Jonatan Foy Age: 36M [birth year] MRN: [id number]   Todays Provider: Dr. Calles    Appt: 8/2/2017             PH Medications: N/A                  Todays Testing: N/A                  Patient Update: Pt was last seen on 6/30/17 by Dr. Calles, at that time we recommended he complete a RHC and follow up after testing.    Pt has a history of CKD, PH with elevated LVEDP, ASHD without significant obstruction, HTN, and Anemia.             Recent Testing Prior to Appointment:        Date Test Results        7/19/2017 RHC RA: 14, 14, 11 PA: 50/22, 35 PVR: 96 (1.2)     RV: 50, 10 PAW: 22, 40, 20 COI: 13.0 (6.5)

## 2017-07-31 NOTE — LETTER
September 26, 2017    Jonatan Foy  [address] [address]      Dear Jonatan Foy,    [facility] Transplant scheduling office has been trying to reach you to schedule your waitlist appointments.    Our transplant team has not been able to contact you at: [phone], [phone]    Please call  [phone] so we can arrange this important visit.  We look forward to hearing from you.      Thank you,    [facility]  [facility]

## 2017-08-01 NOTE — PROGRESS NOTES
Serafin Nobles D.O.  [facility] [place]    Problem List  1. CKD, renal transplantation with failure  2. Pulmonary hypertension with elevated LVEDP  3. ASHD without signficant obstruction  4. Hypertension  5. Anemia     History  .     Objective         Wt Readings from Last 5 Encounters:   11/16/16 86.2 kg (190 lb)   06/24/15 84.2 kg (185 lb 9.6 oz)   06/14/11 112.9 kg (249 lb)   10/13/10 115.6 kg (254 lb 14.4 oz)   10/04/10 115.2 kg (254 lb)         Meds  Current Outpatient Prescriptions   Medication     minoxidil (LONITEN) 2.5 MG tablet     NIFEdipine (ADALAT CC) 90 MG TB24     lisinopril (PRINIVIL,ZESTRIL) 40 MG tablet     calcium acetate (PHOSLO) 667 MG CAPS     oxyCODONE-acetaminophen (PERCOCET) 5-325 MG per tablet     METOPROLOL TARTRATE 100 MG PO TABS      No current facility-administered medications for this visit.          Labs        Imaging      HEMODYNAMICS:  --HR 78 bpm  --Ao /74/85 mmHg      RIGHT HEART CATHETERIZATION:  BSA 2.03m2  --RA 16/17/16    --RV 58/17  --PA 60/27/38    --PCW 24/38/23    --PA sat 68.4%   --PCW 92.1%  --Arterial sat 98%  --Hgb 9.7 g/dL    --Elizabeth CO 6.5    --Elizabeth CI 3.2    --TD CO 13.3  (moderate TR on TTE)  --TD CI 6.7  (moderate TR on TTE)  --PVR 2.8  --      CORONARY ANGIOGRAM:    --Both coronary arteries arise from their respective cusps.  --Right dominant.  --LM is large without any angiographic evidence of disease.  --LAD is a large type 3 vessel giving rise to septal perforators, a large D1 and small D2 and D3.  There is no angiographic evidence of disease in any segment.  --LCX is a large vessel giving rise to small OM1 and large OM2 and OM3 vessels.  The pLCx has a moderate focal calcification with diffuse minimal luminal irregularities.  The mLCx, dLCx, OM2 and OM3 have no angiographic evidence of disease.     --RCA is a large vessel giving rise to large RPDA and RPL branches.  There is no angiographic evidence of disease.        LEFT HEART  CATHETERIZATION:  --LVEDP 23 mmHg.  --No gradient across the aortic valve on pull back.  --Simultaneous pressures with the RV and LV demonstrated concordance in respiratory variation      EXAMINATION: CT ABDOMEN PELVIS W CONTRAST, 7/22/2016 10:41 AM      TECHNIQUE:  Helical CT images from the lung bases through the  symphysis pubis were obtained with IV contrast. Contrast dose: Isovue  370 114cc      COMPARISON: No prior studies available for comparison.      HISTORY: re-assess  mildly enlarged periaortic nodes and left external  iliac chain nodes seen on 2014 outside abominal pelvis CAT scan,  Awaiting organ transplant status, End stage renal disease. History of  failed renal transplant.      FINDINGS:      Abdomen and pelvis: Postsurgical changes are noted in the left lower  quadrant related to prior renal transplantation. There is a  well-defined hypodense structure measuring 5.2 x 6 cm in the left  lower quadrant medial to the external iliac vasculature demonstrating  internal calcifications, consistent with nonfunctioning transplanted  kidney. Both native kidneys are atrophic with nonobstructing  nephrolithiasis.  The liver demonstrates homogeneous parenchyma without focal lesion.  Spleen, pancreas and both adrenal glands are unremarkable. The  gallbladder is contracted without gallstones or pericholecystic fluid.  No bowel wall thickening or obstruction. Urinary bladder is collapsed.  Multiple mildly enlarged lymph nodes are noted in the retroperitoneum  in a para-aortic location and in the mesentery and pelvis. For example  there is a left para-aortic lymph node measuring 11.5 cm (image 41,  series 2). There is a left common iliac lymph node measuring 1.7 x 1.5  cm (image 51, series 2). Mildly enlarged bilateral inguinal lymph  nodes are also noted, largest measuring 2.5 x 1.3 cm on the left.  Major abdominal vasculature is patent. No free fluid in the abdomen or  pelvis.  Partially visualized  shunt coiled  in the lower abdomen.      Lung bases: Minimal right basilar atelectasis.      Bones and soft tissues: Lytic focus in the right iliac bone (series 2  image 54).  Diffuse mild increase density of the osseous structures  consistent with renal osteodystrophy. Multilevel degenerative changes  are noted in the spine.  Small fat-containing umbilical hernia.          IMPRESSION:    1. Nonfunctioning renal transplant in the left lower quadrant with  atrophic native kidneys.  2. Multiple mildly enlarged lymph nodes in the retroperitoneum,  mesentery and pelvis.  These could be reactive or related to a  lymphoproliferative disorder such as PTLD.  Currently, previous CT  scans are unavailable for comparison, including the 2014 outside  abdomen and pelvis CT mentioned in the procedure comments.  If this  study is made available, an addendum documenting stability could be  provided.  3.  Small, non-aggressive appearing lytic focus in the right iliac  bone, of uncertain clinical significance.  Recommend comparison with  outside films.  4.  Diffuse sclerosis of the bones, and possibly related to renal  Osteodystrophy.             Name: GABRIEL FORBES  MRN: [id number]  : 1980  Study Date: 2016 11:07 AM  Age: 36 yrs  Gender: Male  Patient Location: [facility]  Reason For Study:     Ordering Physician: TYSON SANTACRUZ  Referring Physician: TYSON SANTACRUZ  Performed By: Shanel Rubin RDCS     BSA: 2.0 m2  Height: 67 in  Weight: 185 lb  HR: 73  BP: 151/85 mmHg  ______________________________________________________________________________        Procedure  Echocardiogram with two-dimensional, color and spectral Doppler performed.  ______________________________________________________________________________     Interpretation Summary  Global and regional left ventricular function is normal with an EF of 60-65%.  Mild concentric wall thickening consistent with left ventricular hypertrophy  is present.  Right ventricular  function, chamber size, wall motion, and thickness are  normal.  Moderate pulmonary hypertension is present.  No significant change from prior study.          [facility]  [facility]  [address] [place] [address]     Name: GABRIEL FORBES  MRN: [id number]  : 1980  Study Date: 2016 11:07 AM  Age: 36 yrs  Gender: Male  Patient Location: [facility]  Reason For Study:     Ordering Physician: TYSON SANTACRUZ  Referring Physician: TYSON SANTACRUZ  Performed By: Shanel Rubin RDCS     BSA: 2.0 m2  Height: 67 in  Weight: 185 lb  HR: 73  BP: 151/85 mmHg  ______________________________________________________________________________        Procedure  Echocardiogram with two-dimensional, color and spectral Doppler performed.  ______________________________________________________________________________     Interpretation Summary  Global and regional left ventricular function is normal with an EF of 60-65%.  Mild concentric wall thickening consistent with left ventricular hypertrophy  is present.  Right ventricular function, chamber size, wall motion, and thickness are  normal.  Moderate pulmonary hypertension is present.  No significant change from prior study.  ______________________________________________________________________________           Left Ventricle  Global and regional left ventricular function is normal with an EF of 60-65%.  Mild concentric wall thickening consistent with left ventricular hypertrophy  is present. Normal left ventricular filling for age.     Right Ventricle  Right ventricular function, chamber size, wall motion, and thickness are  normal.  Atria  Severe left atrial enlargement is present. Mild right atrial enlargement is  present.     Mitral Valve  Mild to moderate mitral insufficiency is present.     Aortic Valve  The aortic valve is tricuspid. Mild aortic insufficiency is present.     Tricuspid  Valve  Moderate tricuspid insufficiency is present. The right ventricular systolic  pressure is approximated at 58.1 mmHg plus the right atrial pressure.  Moderate pulmonary hypertension is present.     Pulmonic Valve  Mild pulmonic insufficiency is present.     Vessels  Dilation of the inferior vena cava is present with normal respiratory  variation in diameter.  Pericardium  No pericardial effusion is present.     ______________________________________________________________________________  MMode/2D Measurements & Calculations  IVSd: 1.3 cm  LVIDd: 5.7 cm  LVIDs: 2.9 cm  LVPWd: 1.4 cm  FS: 49.2 %  EDV(Teich): 160.3 ml  ESV(Teich): 32.1 ml  LV mass(C)d: 335.6 grams  Ao root diam: 3.1 cm  LA dimension: 5.0 cm  asc Aorta Diam: 3.5 cm  LA/Ao: 1.6  LVOT diam: 2.0 cm  LVOT area: 3.3 cm2  LA Volume (BP): 136.0 ml     LA Volume Index (BP): 69.4 ml/m2           Doppler Measurements & Calculations  MV E max hang: 143.1 cm/sec  MV A max hang: 108.4 cm/sec  MV E/A: 1.3  MV dec time: 0.15 sec  Ao V2 max: 234.6 cm/sec  Ao max P.0 mmHg  Ao V2 mean: 162.8 cm/sec  Ao mean P.0 mmHg  Ao V2 VTI: 50.7 cm  MANUEL(I,D): 2.6 cm2  MANUEL(V,D): 2.8 cm2  LV V1 max P.1 mmHg  LV V1 max: 200.9 cm/sec  LV V1 VTI: 39.6 cm  CO(LVOT): 10.4 l/min  CI(LVOT): 5.3 l/min/m2  SV(LVOT): 129.6 ml  PA V2 max: 147.7 cm/sec  PA max P.7 mmHg  PI end-d hang: 158.1 cm/sec  PI max hang: 255.8 cm/sec  PI max P.2 mmHg  TR max hang: 381.0 cm/sec  TR max P.1 mmHg  Pulm Sys Hang: 65.0 cm/sec  Pulm Frank Hang: 77.6 cm/sec  Pulm S/D: 0.84  MANUEL Index (cm2/m2): 1.3           ______________________________________________________________________________        Report approved by: Wagner Gould 2016 01:22 PM         Specimen Collected: 16 11:07 AM   Last Resulted: 16  1:22 PM   Order Details View Encounter Lab and Collection Details Routing Result History                      Reviewed By Shavonne Hand RN on 10/24/2016  4:59 PM    Encounter   View Encounter   Lab Information   RADIOLOGY RESULTS        Signed   Electronically signed by Wagner Diez MD on 16 at 1322 CDT   Additional Information   Specimen ID Bill Type Client ID   [id number]     Specimen Date Taken Specimen Time Taken Specimen Received Date Specimen Received Time Result Date Result Time   Sep 14, 2016 11:07 AM   Sep 14, 2016  1:22 PM   Recipient List for Orders   Sent From To Cc'd Forwarded To Results   2016  1:22 PM Interface, Radiant Ib Two Montana Moyer MD   Echocardiogram [[id number]]   2017 12:49 PM     Echocardiogram [[id number]]          Patient Release Status:   This result is [facility] the [facility]   Exam Information   Exam Date Exam Time Exam Date Exam Time Accession # Performing Department Results    16 11:07 AM 16  1:03 PM [id number] [facility] Echo    Order History   Outpatient   Date/Time Action Taken User Additional Information   16 1023 Release Rimma Aguilera From Order: [id number]   16 1058 Result Harman Rubin, RDCS In process   16 1322 Result Interface, Radiant Ib Two Final       Patient Name MRN Sex    Jonatan Foy [id number] Male 1980   Patient Demographics   Address Phone E-mail Address   [address] [place] [address] [phone] (Home) *Preferred*  [phone] (Work)  [phone] (Mobile) [email or web address]   Order   Echocardiogram [ECH19] (Order [id number])   Referring Provider   Montana Moyer MD   Base CPT Code (Reference Only)   Code CPT Chargeables   ECH19 ECH19 74917     05470.002     34938     46996   Order Information   Order Date/Time Release Date/Time Start Date/Time End Date/Time   16 12:57 PM 16 10:23 AM 16 10:23 AM None   Order Details   Frequency Duration Priority Order Class   None [facility]   Order Providers   Authorizing Provider Encounter Provider Billing Provider   Montana Moyer MD [id number] Chary  Wagner Gallagher MD   ABN Associated with this Order   There is no ABN associated with this order.                   Ordering Provider's NPI: [id number]  Montana Moyer   Echocardiogram [[id number]]   Administratively closed for: Montana Moyer MD Status: Completed   Mode: Ordering in Standing Order - MD Sign mode Communicated by: Shavonne Jordan RN   Additional info: Cosign requirement was administratively closed by [employer], Provider on 06/06/17 1430, Reason - Administrative Decision   Ordering user: Shavonne Jordan RN 07/28/16 1257 Ordering provider: Montana Moyer MD   Ordered during: Telephone on 07/28/2016           Order History   Outpatient   Date/Time Action Taken User Additional Information   09/14/16 1023 Release Rimma Aguliera From Order: [id number]   09/14/16 1058 Result Harman Rubin, RDCS In process   09/14/16 1322 Result Interface, Radiant Ib Two Final   Original Order   Ordered On Ordered By      7/28/2016 12:57 PM Shavonne Jordan RN         Order Questions   Question Answer Comment   Procedure to be scheduled at [facility]    Perform bubble study? No    Associated Diagnoses   Screening for cardiovascular condition [Z13.6]       End stage renal disease (H) [N18.6]       Organ transplant candidate [Z76.82]       Source Order Set   Order Set Name Order ID    [id number]   Collection Information   Specimen ID: [id number]       Collected: 9/14/2016 11:07 AM -  1:22 PM (134 minutes)    Resulting Agency: RADIOLOGY RESULTS      External Result Report   External Result Report   Encounter   View Encounter   Order Report   View Order Information   External System: External ID:   [facility]   RADCODE ECH19       Assessment/Plan     1. No specific cardiovascular contraindication to contemplated renal transplant  2. Increase RA, PCP and LVEDP suggest that remains volume overloaded and reconsideration of reset of dry weight.    CC: Doctor above.

## 2017-08-02 ENCOUNTER — OFFICE VISIT (OUTPATIENT)
Dept: CARDIOLOGY | Facility: CLINIC | Age: 37
End: 2017-08-02
Attending: INTERNAL MEDICINE
Payer: MEDICARE

## 2017-08-02 VITALS
DIASTOLIC BLOOD PRESSURE: 78 MMHG | SYSTOLIC BLOOD PRESSURE: 144 MMHG | HEIGHT: 67 IN | HEART RATE: 64 BPM | OXYGEN SATURATION: 98 % | BODY MASS INDEX: 28.12 KG/M2 | WEIGHT: 179.2 LBS

## 2017-08-02 DIAGNOSIS — I27.20 PULMONARY HYPERTENSION (H): Primary | ICD-10-CM

## 2017-08-02 PROCEDURE — 99212 OFFICE O/P EST SF 10 MIN: CPT | Mod: ZF

## 2017-08-02 PROCEDURE — 99212 OFFICE O/P EST SF 10 MIN: CPT | Mod: ZP | Performed by: INTERNAL MEDICINE

## 2017-08-02 ASSESSMENT — PAIN SCALES - GENERAL: PAINLEVEL: NO PAIN (0)

## 2017-08-02 NOTE — PATIENT INSTRUCTIONS
Medication Changes:  No medication changes at this time. Please continue current medication regiment.    Patient Instructions:      Follow up Appointment Information:  You will only need to follow up with us on an as needed basis.  Please call us if your symptoms worsen or fail to improve for a follow up appointment.  Thank you    We are located on the third floor of the [facility] on the [facility].  Our address is     [address] [place] [address] [place] [address] [place] [address]      Thank you for allowing us to be a part of your care here at the [facility]    If you have questions or concerns please contact us at:    Crys Gerardo, RN, BSN    Catracho Paz (Schedule,P.A.)  Nurse Coordinator     Clinic   Pulmonary Hypertension   Pulmonary Hypertension  [facility]  [phone])[phone]    (P) [phone]        (F)[phone]                ** Please note that you will NOT receive a reminder call regarding your scheduled testing, reminder calls are for provider appointments only.  If you are scheduled for testing within the [facility] system you may receive a call regarding pre-registration for billing purposes only.**     Remember to weigh yourself daily after voiding and before you consume any food or beverages and log the numbers.  If you have gained/lost 2 pounds overnight or 5 pounds in a week contact us immediately for medication adjustments or further instructions.  **Please call us immediately if you have any syncope, chest pain, edema, or decline in your functional status.

## 2017-08-02 NOTE — MR AVS SNAPSHOT
After Visit Summary   8/2/2017    Jonatan Foy    MRN: [id number]           Patient Information     Date Of Birth          1980        Visit Information        Provider Department      8/2/2017 12:00 PM Roberto Calles MD [facility]        Today's Diagnoses     Pulmonary hypertension (H)    -  1      Care Instructions    Medication Changes:  No medication changes at this time. Please continue current medication regiment.    Patient Instructions:      Follow up Appointment Information:  You will only need to follow up with us on an as needed basis.  Please call us if your symptoms worsen or fail to improve for a follow up appointment.  Thank you    We are located on the third floor of the [facility] ([facility] on the [facility].  Our address is     [address] [place] [address] on [address] [place] [address]      Thank you for allowing us to be a part of your care here at the [facility]    If you have questions or concerns please contact us at:    Crys Gerardo RN, BSN    Catracho Paz (Schedule,P.A.)  Nurse Coordinator     Clinic   Pulmonary Hypertension   Pulmonary Hypertension  [facility]  (P)[phone]    (P) [phone]        (F)[phone]                ** Please note that you will NOT receive a reminder call regarding your scheduled testing, reminder calls are for provider appointments only.  If you are scheduled for testing within the [facility] system you may receive a call regarding pre-registration for billing purposes only.**     Remember to weigh yourself daily after voiding and before you consume any food or beverages and log the numbers.  If you have gained/lost 2 pounds overnight or 5 pounds in a week contact us immediately for medication adjustments or further instructions.  **Please call us immediately if  "you have any syncope, chest pain, edema, or decline in your functional status.          Follow-ups after your visit        Follow-up notes from your care team     Return if symptoms worsen or fail to improve.      Who to contact     If you have questions or need follow up information about today's clinic visit or your schedule please contact [facility] directly at [phone].  Normal or non-critical lab and imaging results will be communicated to you by [employer], letter or phone within 4 business days after the clinic has received the results. If you do not hear from us within 7 days, please contact the clinic through [employer] or phone. If you have a critical or abnormal lab result, we will notify you by phone as soon as possible.  Submit refill requests through [employer] or call your pharmacy and they will forward the refill request to us. Please allow 3 business days for your refill to be completed.          Additional Information About Your Visit        [employer] Information     [employer] gives you secure access to your electronic health record. If you see a primary care provider, you can also send messages to your care team and make appointments. If you have questions, please call your primary care clinic.  If you do not have a primary care provider, please call [phone] and they will assist you.        Care EveryWhere ID     This is your Care EveryWhere ID. This could be used by other organizations to access your [facility] medical records  [id number]        Your Vitals Were     Pulse Height Pulse Oximetry BMI (Body Mass Index)          64 1.702 m (5' 7\") 98% 28.07 kg/m2         Blood Pressure from Last 3 Encounters:   08/02/17 144/78   07/19/17 (!) 178/104   06/30/17 122/67    Weight from Last 3 Encounters:   08/02/17 81.3 kg (179 lb 3.2 oz)   07/19/17 80.7 kg (178 lb)   06/30/17 81.7 kg (180 lb 3.2 oz)              Today, you had the following     No orders found for display       Primary Care Provider "    Physician No Ref-Primary       No address on file        Equal Access to Services     ROSY BRAY : Brittney salomon elam marie Del Valle, jorgeda danajoel, michael andrewbette martkrissycornelio, waxjosé evan honeylianne ramirezrobertdaniela merritt. So [facility] [phone].    ATENCIÓN: Si habla español, tiene a mcarthur disposición servicios gratuitos de asistencia lingüística. Llame al [phone].    We comply with applicable federal civil rights laws and [place] laws. We do not discriminate on the basis of race, color, national origin, age, disability sex, sexual orientation or gender identity.            Thank you!     Thank you for choosing [facility]  for your care. Our goal is always to provide you with excellent care. Hearing back from our patients is one way we can continue to improve our services. Please take a few minutes to complete the written survey that you may receive in the mail after your visit with us. Thank you!             Your Updated Medication List - Protect others around you: Learn how to safely use, store and throw away your medicines at www.disposemymeds.org.          This list is accurate as of: 8/2/17 11:59 PM.  Always use your most recent med list.                   Brand Name Dispense Instructions for use Diagnosis    lisinopril 40 MG tablet    PRINIVIL/ZESTRIL     Take 40 mg by mouth        metoprolol 100 MG tablet    LOPRESSOR     ONE TABLET TWICE DAILY        minoxidil 2.5 MG tablet    LONITEN     Take 2.5 mg by mouth 2 times daily        NIFEdipine ER 90 MG Tb24    ADALAT CC     Take 90 mg by mouth daily        oxyCODONE-acetaminophen 5-325 MG per tablet    PERCOCET     Take by mouth every 4 hours as needed for moderate to severe pain

## 2017-08-02 NOTE — LETTER
8/2/2017      RE: Jonatan Foy  [address] [facility] [address]       Dear Colleague,    Thank you for the opportunity to participate in the care of your patient, Jonatan Foy, at the [facility] at [facility]. Please see a copy of my visit note below.    Serafin Nobles D.O.  [facility]    Problem List  1. CKD, renal transplantation with failure  2. Pulmonary hypertension with elevated LVEDP  3. ASHD without signficant obstruction  4. Hypertension  5. Anemia     History  .     Objective         Wt Readings from Last 5 Encounters:   11/16/16 86.2 kg (190 lb)   06/24/15 84.2 kg (185 lb 9.6 oz)   06/14/11 112.9 kg (249 lb)   10/13/10 115.6 kg (254 lb 14.4 oz)   10/04/10 115.2 kg (254 lb)         Meds      Current Outpatient Prescriptions   Medication     minoxidil (LONITEN) 2.5 MG tablet     NIFEdipine (ADALAT CC) 90 MG TB24     lisinopril (PRINIVIL,ZESTRIL) 40 MG tablet     calcium acetate (PHOSLO) 667 MG CAPS     oxyCODONE-acetaminophen (PERCOCET) 5-325 MG per tablet     METOPROLOL TARTRATE 100 MG PO TABS      No current facility-administered medications for this visit.          Labs        Imaging      HEMODYNAMICS:  --HR 78 bpm  --Ao /74/85 mmHg      RIGHT HEART CATHETERIZATION:  BSA 2.03m2  --RA 16/17/16    --RV 58/17  --PA 60/27/38    --PCW 24/38/23    --PA sat 68.4%   --PCW 92.1%  --Arterial sat 98%  --Hgb 9.7 g/dL    --Elizabeth CO 6.5    --Elizabeth CI 3.2    --TD CO 13.3  (moderate TR on TTE)  --TD CI 6.7  (moderate TR on TTE)  --PVR 2.8  --      CORONARY ANGIOGRAM:    --Both coronary arteries arise from their respective cusps.  --Right dominant.  --LM is large without any angiographic evidence of disease.  --LAD is a large type 3 vessel giving rise to septal perforators, a large D1 and small D2 and D3.  There is no angiographic evidence of disease in any segment.  --LCX is a large vessel giving rise to small OM1 and large OM2  and OM3 vessels.  The pLCx has a moderate focal calcification with diffuse minimal luminal irregularities.  The mLCx, dLCx, OM2 and OM3 have no angiographic evidence of disease.     --RCA is a large vessel giving rise to large RPDA and RPL branches.  There is no angiographic evidence of disease.        LEFT HEART CATHETERIZATION:  --LVEDP 23 mmHg.  --No gradient across the aortic valve on pull back.  --Simultaneous pressures with the RV and LV demonstrated concordance in respiratory variation      EXAMINATION: CT ABDOMEN PELVIS W CONTRAST, 7/22/2016 10:41 AM      TECHNIQUE:  Helical CT images from the lung bases through the  symphysis pubis were obtained with IV contrast. Contrast dose: Isovue  370 114cc      COMPARISON: No prior studies available for comparison.      HISTORY: re-assess  mildly enlarged periaortic nodes and left external  iliac chain nodes seen on 2014 outside abominal pelvis CAT scan,  Awaiting organ transplant status, End stage renal disease. History of  failed renal transplant.      FINDINGS:      Abdomen and pelvis: Postsurgical changes are noted in the left lower  quadrant related to prior renal transplantation. There is a  well-defined hypodense structure measuring 5.2 x 6 cm in the left  lower quadrant medial to the external iliac vasculature demonstrating  internal calcifications, consistent with nonfunctioning transplanted  kidney. Both native kidneys are atrophic with nonobstructing  nephrolithiasis.  The liver demonstrates homogeneous parenchyma without focal lesion.  Spleen, pancreas and both adrenal glands are unremarkable. The  gallbladder is contracted without gallstones or pericholecystic fluid.  No bowel wall thickening or obstruction. Urinary bladder is collapsed.  Multiple mildly enlarged lymph nodes are noted in the retroperitoneum  in a para-aortic location and in the mesentery and pelvis. For example  there is a left para-aortic lymph node measuring 11.5 cm (image 41,  series  2). There is a left common iliac lymph node measuring 1.7 x 1.5  cm (image 51, series 2). Mildly enlarged bilateral inguinal lymph  nodes are also noted, largest measuring 2.5 x 1.3 cm on the left.  Major abdominal vasculature is patent. No free fluid in the abdomen or  pelvis.  Partially visualized  shunt coiled in the lower abdomen.      Lung bases: Minimal right basilar atelectasis.      Bones and soft tissues: Lytic focus in the right iliac bone (series 2  image 54).  Diffuse mild increase density of the osseous structures  consistent with renal osteodystrophy. Multilevel degenerative changes  are noted in the spine.  Small fat-containing umbilical hernia.          IMPRESSION:    1. Nonfunctioning renal transplant in the left lower quadrant with  atrophic native kidneys.  2. Multiple mildly enlarged lymph nodes in the retroperitoneum,  mesentery and pelvis.  These could be reactive or related to a  lymphoproliferative disorder such as PTLD.  Currently, previous CT  scans are unavailable for comparison, including the  outside  abdomen and pelvis CT mentioned in the procedure comments.  If this  study is made available, an addendum documenting stability could be  provided.  3.  Small, non-aggressive appearing lytic focus in the right iliac  bone, of uncertain clinical significance.  Recommend comparison with  outside films.  4.  Diffuse sclerosis of the bones, and possibly related to renal  Osteodystrophy.             Name: GABRIEL FORBES  MRN: [id number]  : 1980  Study Date: 2016 11:07 AM  Age: 36 yrs  Gender: Male  Patient Location: [facility]  Reason For Study:     Ordering Physician: TYSON SANTACRUZ  Referring Physician: TYSON SANTACRUZ  Performed By: Shanel Rubin RDCS     BSA: 2.0 m2  Height: 67 in  Weight: 185 lb  HR: 73  BP: 151/85 mmHg  ______________________________________________________________________________        Procedure  Echocardiogram with two-dimensional, color and spectral  Doppler performed.  ______________________________________________________________________________     Interpretation Summary  Global and regional left ventricular function is normal with an EF of 60-65%.  Mild concentric wall thickening consistent with left ventricular hypertrophy  is present.  Right ventricular function, chamber size, wall motion, and thickness are  normal.  Moderate pulmonary hypertension is present.  No significant change from prior study.          [facility]  Diagnostic and Treamtent-3rd Floor  [address] [place] [address]     Name: GABRIEL FORBES  MRN: [id number]  : 1980  Study Date: 2016 11:07 AM  Age: 36 yrs  Gender: Male  Patient Location: [facility]  Reason For Study:     Ordering Physician: TYSON SANTACRUZ  Referring Physician: TYSON SANTACRUZ  Performed By: Shanel Rubin RDCS     BSA: 2.0 m2  Height: 67 in  Weight: 185 lb  HR: 73  BP: 151/85 mmHg  ______________________________________________________________________________        Procedure  Echocardiogram with two-dimensional, color and spectral Doppler performed.  ______________________________________________________________________________     Interpretation Summary  Global and regional left ventricular function is normal with an EF of 60-65%.  Mild concentric wall thickening consistent with left ventricular hypertrophy  is present.  Right ventricular function, chamber size, wall motion, and thickness are  normal.  Moderate pulmonary hypertension is present.  No significant change from prior study.  ______________________________________________________________________________           Left Ventricle  Global and regional left ventricular function is normal with an EF of 60-65%.  Mild concentric wall thickening consistent with left ventricular hypertrophy  is present. Normal left ventricular filling for age.     Right Ventricle  Right ventricular function, chamber  size, wall motion, and thickness are  normal.  Atria  Severe left atrial enlargement is present. Mild right atrial enlargement is  present.     Mitral Valve  Mild to moderate mitral insufficiency is present.     Aortic Valve  The aortic valve is tricuspid. Mild aortic insufficiency is present.     Tricuspid Valve  Moderate tricuspid insufficiency is present. The right ventricular systolic  pressure is approximated at 58.1 mmHg plus the right atrial pressure.  Moderate pulmonary hypertension is present.     Pulmonic Valve  Mild pulmonic insufficiency is present.     Vessels  Dilation of the inferior vena cava is present with normal respiratory  variation in diameter.  Pericardium  No pericardial effusion is present.     ______________________________________________________________________________  MMode/2D Measurements & Calculations  IVSd: 1.3 cm  LVIDd: 5.7 cm  LVIDs: 2.9 cm  LVPWd: 1.4 cm  FS: 49.2 %  EDV(Teich): 160.3 ml  ESV(Teich): 32.1 ml  LV mass(C)d: 335.6 grams  Ao root diam: 3.1 cm  LA dimension: 5.0 cm  asc Aorta Diam: 3.5 cm  LA/Ao: 1.6  LVOT diam: 2.0 cm  LVOT area: 3.3 cm2  LA Volume (BP): 136.0 ml     LA Volume Index (BP): 69.4 ml/m2           Doppler Measurements & Calculations  MV E max hang: 143.1 cm/sec  MV A max hang: 108.4 cm/sec  MV E/A: 1.3  MV dec time: 0.15 sec  Ao V2 max: 234.6 cm/sec  Ao max P.0 mmHg  Ao V2 mean: 162.8 cm/sec  Ao mean P.0 mmHg  Ao V2 VTI: 50.7 cm  MANUEL(I,D): 2.6 cm2  MANUEL(V,D): 2.8 cm2  LV V1 max P.1 mmHg  LV V1 max: 200.9 cm/sec  LV V1 VTI: 39.6 cm  CO(LVOT): 10.4 l/min  CI(LVOT): 5.3 l/min/m2  SV(LVOT): 129.6 ml  PA V2 max: 147.7 cm/sec  PA max P.7 mmHg  PI end-d hang: 158.1 cm/sec  PI max hang: 255.8 cm/sec  PI max P.2 mmHg  TR max hang: 381.0 cm/sec  TR max P.1 mmHg  Pulm Sys Hang: 65.0 cm/sec  Pulm Frank Hang: 77.6 cm/sec  Pulm S/D: 0.84  MANUEL Index (cm2/m2):  1.3           ______________________________________________________________________________        Report approved by: Wagner Gould 2016 01:22 PM         Specimen Collected: 16 11:07 AM   Last Resulted: 16  1:22 PM   Order Details View Encounter Lab and Collection Details Routing Result History                      Reviewed By Shavonne Hand RN on 10/24/2016  4:59 PM   Encounter   View Encounter   Lab Information   RADIOLOGY RESULTS        Signed   Electronically signed by Wagner Diez MD on 16 at 1322 CDT   Additional Information   Specimen ID Bill Type Client ID   [id number]     Specimen Date Taken Specimen Time Taken Specimen Received Date Specimen Received Time Result Date Result Time   Sep 14, 2016 11:07 AM   Sep 14, 2016  1:22 PM   Recipient List for Orders   Sent From To Cc'd Forwarded To Results   2016  1:22 PM Interface, Radiant Ib Two Montana Moyer MD   Echocardiogram [[id number]]   2017 12:49 PM     Echocardiogram [[id number]]          Patient Release Status:   [facility]   Exam Information   Exam Date Exam Time Exam Date Exam Time Accession # Performing Department Results    16 11:07 AM 16  1:03 PM [id number] [facility] Echo    Order History   Outpatient   Date/Time Action Taken User Additional Information   16 1023 Release Rimma Aguilera From Order: [id number]   16 1058 Result Harman Rubin RDCS In process   16 1322 Result Interface, Radiant Ib Two Final       Patient Name MRN Sex    Jonatan Foy [id number] Male 1980   Patient Demographics   Address Phone E-mail Address   [address] [place] [address] [phone] (Home) *Preferred*  [phone] (Work)  [phone] (Mobile) [email or web address]   Order   Echocardiogram [ECH19] (Order [id number])   Referring Provider   Montana Moyer MD   Base CPT Code (Reference Only)   Code CPT Chargeables    ECH19 ECH19 00516     55874.002     36080     09529   Order Information   Order Date/Time Release Date/Time Start Date/Time End Date/Time   07/28/16 12:57 PM 09/14/16 10:23 AM 09/14/16 10:23 AM None   Order Details   Frequency Duration Priority Order Class   None None Routine [facility] ECHO   Order Providers   Authorizing Provider Encounter Provider Billing Provider   Montana Moyer MD [id number] Wagner Diez MD   ABN Associated with this Order   There is no ABN associated with this order.                   Ordering Provider's NPI: [id number]  Montana Moyer   Echocardiogram [[id number]]   Administratively closed for: Montana Moyer MD Status: Completed   Mode: Ordering in Standing Order - MD Sign mode Communicated by: Shavonne Jordan RN   Additional info: Cosign requirement was administratively closed by [employer], Provider on 06/06/17 1430, Reason - Administrative Decision   Ordering user: Shavonne Jordan RN 07/28/16 1257 Ordering provider: Montana Moyer MD   Ordered during: Telephone on 07/28/2016           Order History   Outpatient   Date/Time Action Taken User Additional Information   09/14/16 1023 Release Rimma Aguilera From Order: [id number]   09/14/16 1058 Result Harman Rubin, RDMERLYN In process   09/14/16 1322 Result Interface, Radiant Ib Two Final   Original Order   Ordered On Ordered By      7/28/2016 12:57 PM Shavonne Jordan RN         Order Questions   Question Answer Comment   Procedure to be scheduled at [facility]    Perform bubble study? No    Associated Diagnoses   Screening for cardiovascular condition [Z13.6]       End stage renal disease (H) [N18.6]       Organ transplant candidate [Z76.82]       Source Order Set   Order Set Name Order ID    [id number]   Collection Information   Specimen ID: [id number]       Collected: 9/14/2016 11:07 AM -  1:22 PM (134 minutes)    Resulting Agency: RADIOLOGY RESULTS      External Result Report   External Result Report   Encounter   View Encounter   Order  Report   View Order Information   External System: External ID:   [facility]   RADCODE ECH19       Assessment/Plan     1. No specific cardiovascular contraindication to contemplated renal transplant  2. Increase RA, PCP and LVEDP suggest that remains volume overloaded and reconsideration of reset of dry weight.    CC: Doctor above.      Roberto Calles MD

## 2017-08-02 NOTE — NURSING NOTE
Chief Complaint   Patient presents with     Consult For     Return for PH F/U     Vitals were taken and medications were reconciled.    Lynette Coles CMA     11:46 AM

## 2017-08-02 NOTE — NURSING NOTE
Med Reconcile: Reviewed and verified all current medications with the patient. The updated medication list was printed and given to the patient.  Patient stated he understood all health information given and agreed to call with further questions or concerns.     Medication Changes:  No medication changes at this time. Please continue current medication regiment.    Patient Instructions:      Follow up Appointment Information:  You will only need to follow up with us on an as needed basis.  Please call us if your symptoms worsen or fail to improve for a follow up appointment.  Thank you

## 2017-08-23 ENCOUNTER — DOCUMENTATION ONLY (OUTPATIENT)
Dept: TRANSPLANT | Facility: CLINIC | Age: 37
End: 2017-08-23

## 2017-08-23 DIAGNOSIS — Z76.82 ORGAN TRANSPLANT CANDIDATE: Primary | ICD-10-CM

## 2017-08-23 DIAGNOSIS — N18.6 END STAGE RENAL DISEASE (H): ICD-10-CM

## 2017-08-23 NOTE — PROGRESS NOTES
Received in basket message from Zakia Sal to schedule Jonatan for wait list clinic with her and also the . I did confirm that Jonatan is compliant with all of his dialysis treatments with his dialysis unit.They will send me the last 6 months of his attendance records. I was hoping to connect with his nephrologist but Dr Angel Ring no longer sees patients at the [place] dialysis unit. His new nephrologist is Serafin Nobles and he is new to [facility] and not very familiar with Jonatan yet.

## 2017-09-20 NOTE — TELEPHONE ENCOUNTER
Left voicemail for Jonatan to return my call on mobile line.  His home line doesn't have a voicemail set up.

## 2017-10-10 ENCOUNTER — TELEPHONE (OUTPATIENT)
Dept: TRANSPLANT | Facility: CLINIC | Age: 37
End: 2017-10-10
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
## 2017-11-21 ENCOUNTER — OFFICE VISIT (OUTPATIENT)
Dept: NEPHROLOGY | Facility: CLINIC | Age: 37
End: 2017-11-21
Attending: PHYSICIAN ASSISTANT
Payer: MEDICARE

## 2017-11-21 ENCOUNTER — ALLIED HEALTH/NURSE VISIT (OUTPATIENT)
Dept: TRANSPLANT | Facility: CLINIC | Age: 37
End: 2017-11-21
Attending: PHYSICIAN ASSISTANT
Payer: MEDICARE

## 2017-11-21 VITALS
SYSTOLIC BLOOD PRESSURE: 150 MMHG | BODY MASS INDEX: 29.38 KG/M2 | HEART RATE: 67 BPM | HEIGHT: 67 IN | DIASTOLIC BLOOD PRESSURE: 84 MMHG | WEIGHT: 187.2 LBS | TEMPERATURE: 98.3 F

## 2017-11-21 DIAGNOSIS — Z76.82 ORGAN TRANSPLANT CANDIDATE: Primary | ICD-10-CM

## 2017-11-21 DIAGNOSIS — Z99.2 ESRD (END STAGE RENAL DISEASE) ON DIALYSIS (H): Primary | ICD-10-CM

## 2017-11-21 DIAGNOSIS — N18.6 ESRD (END STAGE RENAL DISEASE) ON DIALYSIS (H): Primary | ICD-10-CM

## 2017-11-21 PROCEDURE — 99213 OFFICE O/P EST LOW 20 MIN: CPT | Mod: ZF

## 2017-11-21 ASSESSMENT — PAIN SCALES - GENERAL: PAINLEVEL: NO PAIN (0)

## 2017-11-21 NOTE — PROGRESS NOTES
Assessment and Plan:  1. Kidney transplant wait list evaluation - Mr. Foy is a good candidate overall.   2. 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  3. Cardiac risk - he had cardiology evaluation for risk assessment and evaluation of pulmonary hypertension, which was thought to be likely related to volume overload based on RHC x 2 (mean PA pressure 35 in 2017). Coronary angiogram in 2016 did not identify any significant CAD. He is quite physically active (walks 3-4 miles per day) without any exertional symptoms.   4. Lymphadenopathy - noted to be smaller in size on a 2016 CT when compared to previous  CT. LDH and EBV negative. Will review with committee whether or not further surveillance imaging is required.   5. History of non-compliance - he successfully completed a compliance contract and has the support of his nephrologist. He did miss a few cardiology follow up visits but notes this was due to family illness (his mother recently passed away and daughter had been sick) and transportation issues (he uses medical transport). Appreciate social work input.  6. Pseudotumor cerebri s/p  shunt , last checked about 4 years ago.       Discussed the risks and benefits of a transplant, including the risk of surgery and immunosuppression medications.    KDPI: We discussed approximate remaining wait time and how that is influenced by issues such as blood type and sensitization (PRA) and access to a living donor. I contrasted potential waiting time for living vs  donor kidneys from  normal (0-85%) or higher (%) kidney donor profile index (KDPI) donors and their associated outcomes. I would not recommend Mr. Foy to consider kidneys from high KDPI donors. The reason for this decision  is best summarized as: improved long term graft survival.    Patient s overall evaluation will require further discussion in the Transplant Program s multidisciplinary selection committee for a final recommendation on the patient s suitability for transplant.     Reason for Visit:  Jonatan Foy is a 37-year-old male with ESKD from congenital solitary kidney, who presents for kidney transplant wait list evaluation.     Last Evaluation Clinic Visit Date:  June 2015        Wait List Date:   Current phase/status: Evaluation: Active as of 10/19/2016  Transplant coordinator: Sapna Sanz Transplant Office phone number [phone]     Previous Medical Issues:  1. Non-compliance. Completed compliance contract x 6 months. Letter of support from nephrologist 8/2016.  2. Abnormal CT 2/2014 with increased lymphadenopathy. Repeat CT 7/2016 with radiology comparison to 2/2014 CT showed few LN slightly smaller but most unchanged, stable nonaggressive appearing lytic focus in the right iliac bone.  and neg EBV PCR 7/2016.     HPI:   Jonatan Foy is a 37-year-old male with ESKD from congenital solitary kidney s/p LDKT June 2007 failed due to rejection in the setting of non-compliance with medications and back on dialysis since 2013. He returns today for continued kidney transplant evaluation.         Interim events/issues/events  He had cardiology evaluation for pulmonary hypertension that was found on an ECHO in September 2016 (RVSP 58.1 mmHg). He had a right and left heart catheterization in December 2016 that showed pulmonary hypertension (mean PA pressure 38 mmHg) with a suggestion of volume overload and no significant coronary artery disease. He worked on getting his EDW down and had a repeat right heart catheterization in July 2017 that showed a mean PA pressure of 35 mmHg.     He had an admit in January 2017 for traumatic hematoma that resolved spontaneously. There was no DVT or anticoagulation required. He has  otherwise not had any recent admits or blood transfusions.    Dialysis continues to go well, although he notes he typically comes in over his EDW on Tuesdays. He is anuric. He notes a stable energy level and appetite. No nausea, vomiting, or diarrhea. He continues to work full-time delivering mail and estimates that he walks at least 3-4 miles per day. No chest pain, SOB, or claudication symptoms. No fevers, sweats, chills, or recent illness.    His other medical history is significant for pseudotumor cerebri s/p  shunt 1998, last checked about 4 years ago.         Kidney Disease Hx       Kidney Disease Dx: solitary kidney       Biopsy Proven: No        On Dialysis: Yes, Date initiated: 2013 and Dialysis Type: [facility];       Primary Nephrologist: Dr. Nobles          Uremic Symptoms: Fatigue: No; Cold: No; Nausea: No; Poor Appetite: No; Metallic Taste: No; Edema: No;          Potential Donor(s): No          Cardiac history:       Last cardiac risk assessment: August 2017 following repeat RHC in July 2017- pulmonary HTN thought related to volume and no cardiac contraindication for moving forward with transplant was noted.       Last coronary angiogram: December 2016, no significant CAD       Exertional symptoms: none       Recent cardiac events: none     Pertinent issues:  Blood transfusion: Yes; but not recently  Jehovah s Witness: No  Previous transplant: Yes  Urine output: No  Bladder dysfunction: No  Claudication: No  Previous amputation: No  Cancer: No  Recurrent infection: No  Chronic anticoagulation: No      ROS:  A comprehensive review of systems was obtained and negative, except as noted in the HPI or PMH.     PMH:  Medical records were obtained and reviewed.  Past Medical History:   Diagnosis Date     Atrial fibrillation (H)      Congenital solitary kidney      ESRD (end stage renal disease) on dialysis (H)      History of blood transfusion      History of myocardial infarction 08/08/2006     HTN  "(hypertension)      Kidney replaced by transplant 2007    living donor transplant     Pseudotumor cerebri      Pulmonary hypertension      Shingles November 2014        PSH:  Past Surgical History:   Procedure Laterality Date     HC LEFT HEART CATHETERIZATION  08/08/2006    normal coronaries, cardiomegaly, pericardial tamponade     TRANSPLANT KIDNEY RECIPIENT LIVING UNRELATED  2007    [facility]     VENTRICLE SHUNT, ABDOMEN  1997    [facility] - Related to Pseudotumor cerebri        Personal Hx:  Social History     Social History     Marital status:      Spouse name: N/A     Number of children: N/A     Years of education: N/A     Occupational History     Not on file.     Social History Main Topics     Smoking status: Never Smoker     Smokeless tobacco: Never Used     Alcohol use No     Drug use: No     Sexual activity: Not on file     Other Topics Concern     Parent/Sibling W/ Cabg, Mi Or Angioplasty Before 65f 55m? No     Social History Narrative        Allergies:  Allergies   Allergen Reactions     Tramadol Other (See Comments) and Anaphylaxis     Due to kidney transplant     Codeine Hives     Ibu [Nsaids] Other (See Comments)     Due to kidney transplant     Morphine GI Disturbance        Medications:  Prior to Admission medications    Medication Sig Start Date End Date Taking? Authorizing Provider   minoxidil (LONITEN) 2.5 MG tablet Take 2.5 mg by mouth 2 times daily    Reported, Patient   NIFEdipine (ADALAT CC) 90 MG TB24 Take 90 mg by mouth daily    Reported, Patient   lisinopril (PRINIVIL,ZESTRIL) 40 MG tablet Take 40 mg by mouth    Reported, Patient   oxyCODONE-acetaminophen (PERCOCET) 5-325 MG per tablet Take by mouth every 4 hours as needed for moderate to severe pain    Reported, Patient   METOPROLOL TARTRATE 100 MG PO TABS ONE TABLET TWICE DAILY 6/23/10   Laverne Hayward MD        Vitals:  /84  Pulse 67  Temp 98.3  F (36.8  C) (Oral)  Ht 1.702 m (5' 7\")  Wt 84.9 kg (187 lb 3.2 oz)  BMI " 29.32 kg/m2     Exam:  GENERAL APPEARANCE: alert and no distress  HENT: mouth without ulcers or lesions. Good dentition  LYMPHATICS: no cervical or supraclavicular nodes  RESP: lungs clear to auscultation - no rales, rhonchi or wheezes  CV: regular rhythm, normal rate, no rub, no murmur  EDEMA: no LE edema bilaterally  ABDOMEN: soft, nondistended, nontender  MS: extremities normal - no gross deformities noted, no evidence of inflammation in joints, no muscle tenderness  SKIN: no rash  Femoral pulses 2+ equal bilaterally  No palpable cervical, supracervical, axillary, or inguinal LAD

## 2017-11-21 NOTE — MR AVS SNAPSHOT
"              After Visit Summary   11/21/2017    Jonatan Foy    MRN: [id number]           Patient Information     Date Of Birth          1980        Visit Information        Provider Department      11/21/2017 4:00 PM Zakia Sal PA-C [facility] Nephrology        Today's Diagnoses     ESRD (end stage renal disease) on dialysis (H)    -  1       Follow-ups after your visit        Who to contact     If you have questions or need follow up information about today's clinic visit or your schedule please contact [facility] NEPHROLOGY directly at [phone].  Normal or non-critical lab and imaging results will be communicated to you by [employer], letter or phone within 4 business days after the clinic has received the results. If you do not hear from us within 7 days, please contact the clinic through [employer] or phone. If you have a critical or abnormal lab result, we will notify you by phone as soon as possible.  Submit refill requests through [employer] or call your pharmacy and they will forward the refill request to us. Please allow 3 business days for your refill to be completed.          Additional Information About Your Visit        MyChart Information     [employer] gives you secure access to your electronic health record. If you see a primary care provider, you can also send messages to your care team and make appointments. If you have questions, please call your primary care clinic.  If you do not have a primary care provider, please call [phone] and they will assist you.        Care EveryWhere ID     This is your Care EveryWhere ID. This could be used by other organizations to access your [facility] medical records  [id number]        Your Vitals Were     Pulse Temperature Height BMI (Body Mass Index)          67 98.3  F (36.8  C) (Oral) 1.702 m (5' 7\") 29.32 kg/m2         Blood Pressure from Last 3 Encounters:   11/21/17 150/84   08/02/17 144/78   07/19/17 (!) 178/104    Weight from Last 3 " Encounters:   11/21/17 84.9 kg (187 lb 3.2 oz)   08/02/17 81.3 kg (179 lb 3.2 oz)   07/19/17 80.7 kg (178 lb)              Today, you had the following     No orders found for display         Today's Medication Changes          These changes are accurate as of: 11/21/17 11:59 PM.  If you have any questions, ask your nurse or doctor.               Stop taking these medicines if you haven't already. Please contact your care team if you have questions.     oxyCODONE-acetaminophen 5-325 MG per tablet   Commonly known as:  PERCOCET   Stopped by:  Zakia Sal PA-C                    Primary Care Provider Fax #    Physician No Ref-Primary [phone]       No address on file        Equal Access to Services     ROSY BRAY : Brittney Del Valle, waaxda luqadaha, qaybta kaalmacornelio campuzano, anuel mcdonough . So [facility] [phone].    ATENCIÓN: Si habla español, tiene a mcarthur disposición servicios gratuitos de asistencia lingüística. Llame al [phone].    We comply with applicable federal civil rights laws and [place] laws. We do not discriminate on the basis of race, color, national origin, age, disability, sex, sexual orientation, or gender identity.            Thank you!     Thank you for choosing [facility] NEPHROLOGY  for your care. Our goal is always to provide you with excellent care. Hearing back from our patients is one way we can continue to improve our services. Please take a few minutes to complete the written survey that you may receive in the mail after your visit with us. Thank you!             Your Updated Medication List - Protect others around you: Learn how to safely use, store and throw away your medicines at www.disposemymeds.org.          This list is accurate as of: 11/21/17 11:59 PM.  Always use your most recent med list.                   Brand Name Dispense Instructions for use Diagnosis    lisinopril 40 MG tablet    PRINIVIL/ZESTRIL     Take 40 mg by  mouth        metoprolol 100 MG tablet    LOPRESSOR     ONE TABLET TWICE DAILY        minoxidil 2.5 MG tablet    LONITEN     Take 2.5 mg by mouth 2 times daily        NIFEdipine ER 90 MG Tb24    ADALAT CC     Take 90 mg by mouth daily

## 2017-11-21 NOTE — NURSING NOTE
"Chief Complaint   Patient presents with     RECHECK     follow up with kidney waitlist, [facility] cma       Initial /84  Pulse 67  Temp 98.3  F (36.8  C) (Oral)  Ht 1.702 m (5' 7\")  Wt 84.9 kg (187 lb 3.2 oz)  BMI 29.32 kg/m2 Estimated body mass index is 29.32 kg/(m^2) as calculated from the following:    Height as of this encounter: 1.702 m (5' 7\").    Weight as of this encounter: 84.9 kg (187 lb 3.2 oz).  Medication Reconciliation: complete    "

## 2017-11-21 NOTE — PROGRESS NOTES
Patient Name: Jonatan Foy  : 1980  Age: 37 year old  MRN: [id number]  Date of Initial Social Work Evaluation: 2015    Patient on kidney transplant wait list.  Saw today to update psychosocial assessment.      Presenting Information   Living Situation: Patient resides in a home with his daughter Shweta (12), ex-wife Renetta and her  in [place], [place].  If not local, plans for short term stay:  N/A  Previous Functional Status: Patient is independent with ADL's  Cultural/Language/Spiritual Considerations: None identified at this time.     Support System  Primary Support Person   Other support:   Father and sister who live in [place], [place] and friends  Plan for support in immediate post-transplant period: Ex-wife Renetta and her     Health Care Directive  Decision Maker: Self  Alternate Decision Maker: Father  Health Care Directive: Patient considering completing and Provided education    Mental Health/Coping:   History of Mental Health: Patient denied any history of anxiety, depression, or other mental health concerns.   History of Chemical Health: Patient denied any history of tobacco, substance use, or history of chemical dependency treatment.   Current status: Patient denied any current mental health or chemical dependency concerns.   Coping: Patient appeared to be coping well but reported he was feeling a little stressed out because he was late to his appointment due to his medical cab being late.   Services Needed/Recommended: None identified at this time.     Financial   Income: Patient has been on SSDI since  due to ESRD.   Impact of transplant on income: Informed patient one year post transplant he may no longer be eligible for SSDI due to having a kidney transplant.  Insurance and medication coverage: Medicare and [facility]  Financial concerns: None identified at this time.   Resources needed: None identified at this time.     Assessment and recommendations and plan:  Patient  was placed on a compliance contract when he was initially evaluated which he completed in August 2016. Patient reported he continues to be compliant, attends dialysis regularly and takes his medications regularly. Patient had his first transplant in 2007 at [facility] which failed due to non compliance. Patient did miss three cardiology appointments this past year. When asked why he missed his appointments, he reported he missed one due to his mother being ill (she passed away this past year), the second one he reported it was his fault he missed it because he wrote his scheduled down wrong, and the third was due to his daughter being ill. Patient believes he is call caught up with what he needs from cardiology. Discussed the importance of attending appointments and they are also looked at as compliance concerns. Patient stated he understood. Reviewed transplant education (Medicare, rehabilitation, donor issues, community/financial resources, and psych/family adjustment) as well as psychosocial risks of transplant. Provided patient with a copy of post-transplant informational sheet that includes information on potential costs of medications, Medicare ESRD, post-transplant lodging, etc. Patient seemed to process information well. Appeared well informed, motivated, and able to follow post transplant requirements. Behavior was appropriate during interview. Has adequate income and insurance coverage. Adequate social support. No major contraindications noted for transplant. At this time, patient appears to understand the risks and benefits of transplant.       Lyly Kiran [facility]    Kidney/Pancreas/Auto Islet Transplant Programs     d/c prior to assessment

## 2017-11-21 NOTE — LETTER
11/21/2017       RE: Jonatan Foy  [address] [place] [id number]     Dear Colleague,    Thank you for referring your patient, Jonatan Foy, to the [facility] NEPHROLOGY at [facility]. Please see a copy of my visit note below.    Assessment and Plan:  1. Kidney transplant wait list evaluation - Mr. Foy is a good candidate overall.   2. ESKD from congenital solitary kidney s/p LDKT June 2007 failed due to rejection in the setting of non-compliance with medications and back on dialysis since 2013 - he is doing well on dialysis but would benefit from a second kidney transplant.  3. Cardiac risk - he had cardiology evaluation for risk assessment and evaluation of pulmonary hypertension, which was thought to be likely related to volume overload based on RHC x 2 (mean PA pressure 35 in July 2017). Coronary angiogram in December 2016 did not identify any significant CAD. He is quite physically active (walks 3-4 miles per day) without any exertional symptoms.   4. Lymphadenopathy - noted to be smaller in size on a 2016 CT when compared to previous 2014 CT. LDH and EBV negative. Will review with committee whether or not further surveillance imaging is required.   5. History of non-compliance - he successfully completed a compliance contract and has the support of his nephrologist. He did miss a few cardiology follow up visits but notes this was due to family illness (his mother recently passed away and daughter had been sick) and transportation issues (he uses medical transport). Appreciate social work input.  6. Pseudotumor cerebri s/p  shunt 1998, last checked about 4 years ago.       Discussed the risks and benefits of a transplant, including the risk of surgery and immunosuppression medications.    KDPI: We discussed approximate remaining wait time and how that is influenced by issues such as blood type and sensitization (PRA) and access to a living donor. I  contrasted potential waiting time for living vs  donor kidneys from  normal (0-85%) or higher (%) kidney donor profile index (KDPI) donors and their associated outcomes. I would not recommend Mr. Foy to consider kidneys from high KDPI donors. The reason for this decision is best summarized as: improved long term graft survival.    Patient s overall evaluation will require further discussion in the Transplant Program s multidisciplinary selection committee for a final recommendation on the patient s suitability for transplant.     Reason for Visit:  Jonatan Foy is a 37-year-old male with ESKD from congenital solitary kidney, who presents for kidney transplant wait list evaluation.     Last Evaluation Clinic Visit Date:  2015        Wait List Date:   Current phase/status: Evaluation: Active as of 10/19/2016  Transplant coordinator: Sapna Sanz Transplant Office phone number [phone]     Previous Medical Issues:  1. Non-compliance. Completed compliance contract x 6 months. Letter of support from nephrologist 2016.  2. Abnormal CT 2014 with increased lymphadenopathy. Repeat CT 2016 with radiology comparison to 2014 CT showed few LN slightly smaller but most unchanged, stable nonaggressive appearing lytic focus in the right iliac bone.  and neg EBV PCR 2016.     HPI:   Jonatan Foy is a 37-year-old male with ESKD from congenital solitary kidney s/p LDKT 2007 failed due to rejection in the setting of non-compliance with medications and back on dialysis since . He returns today for continued kidney transplant evaluation.         Interim events/issues/events  He had cardiology evaluation for pulmonary hypertension that was found on an ECHO in 2016 (RVSP 58.1 mmHg). He had a right and left heart catheterization in 2016 that showed pulmonary hypertension (mean PA pressure 38 mmHg) with a suggestion of volume overload and no significant coronary  artery disease. He worked on getting his EDW down and had a repeat right heart catheterization in July 2017 that showed a mean PA pressure of 35 mmHg.     He had an admit in January 2017 for traumatic hematoma that resolved spontaneously. There was no DVT or anticoagulation required. He has otherwise not had any recent admits or blood transfusions.    Dialysis continues to go well, although he notes he typically comes in over his EDW on Tuesdays. He is anuric. He notes a stable energy level and appetite. No nausea, vomiting, or diarrhea. He continues to work full-time delivering mail and estimates that he walks at least 3-4 miles per day. No chest pain, SOB, or claudication symptoms. No fevers, sweats, chills, or recent illness.    His other medical history is significant for pseudotumor cerebri s/p  shunt 1998, last checked about 4 years ago.         Kidney Disease Hx       Kidney Disease Dx: solitary kidney       Biopsy Proven: No        On Dialysis: Yes, Date initiated: 2013 and Dialysis Type: [facility] HD;       Primary Nephrologist: Dr. Nobles          Uremic Symptoms: Fatigue: No; Cold: No; Nausea: No; Poor Appetite: No; Metallic Taste: No; Edema: No;          Potential Donor(s): No          Cardiac history:       Last cardiac risk assessment: August 2017 following repeat RHC in July 2017- pulmonary HTN thought related to volume and no cardiac contraindication for moving forward with transplant was noted.       Last coronary angiogram: December 2016, no significant CAD       Exertional symptoms: none       Recent cardiac events: none     Pertinent issues:  Blood transfusion: Yes; but not recently  Jehovah s Witness: No  Previous transplant: Yes  Urine output: No  Bladder dysfunction: No  Claudication: No  Previous amputation: No  Cancer: No  Recurrent infection: No  Chronic anticoagulation: No      ROS:  A comprehensive review of systems was obtained and negative, except as noted in the HPI or PMH.      PMH:  Medical records were obtained and reviewed.  Past Medical History:   Diagnosis Date     Atrial fibrillation (H)      Congenital solitary kidney      ESRD (end stage renal disease) on dialysis (H)      History of blood transfusion      History of myocardial infarction 08/08/2006     HTN (hypertension)      Kidney replaced by transplant 2007    living donor transplant     Pseudotumor cerebri      Pulmonary hypertension      Shingles November 2014        PSH:  Past Surgical History:   Procedure Laterality Date     HC LEFT HEART CATHETERIZATION  08/08/2006    normal coronaries, cardiomegaly, pericardial tamponade     TRANSPLANT KIDNEY RECIPIENT LIVING UNRELATED  2007    [facility]     VENTRICLE SHUNT, ABDOMEN  1997    [facility]N - Related to Pseudotumor cerebri        Personal Hx:  Social History     Social History     Marital status:      Spouse name: N/A     Number of children: N/A     Years of education: N/A     Occupational History     Not on file.     Social History Main Topics     Smoking status: Never Smoker     Smokeless tobacco: Never Used     Alcohol use No     Drug use: No     Sexual activity: Not on file     Other Topics Concern     Parent/Sibling W/ Cabg, Mi Or Angioplasty Before 65f 55m? No     Social History Narrative        Allergies:  Allergies   Allergen Reactions     Tramadol Other (See Comments) and Anaphylaxis     Due to kidney transplant     Codeine Hives     Ibu [Nsaids] Other (See Comments)     Due to kidney transplant     Morphine GI Disturbance        Medications:  Prior to Admission medications    Medication Sig Start Date End Date Taking? Authorizing Provider   minoxidil (LONITEN) 2.5 MG tablet Take 2.5 mg by mouth 2 times daily    Reported, Patient   NIFEdipine (ADALAT CC) 90 MG TB24 Take 90 mg by mouth daily    Reported, Patient   lisinopril (PRINIVIL,ZESTRIL) 40 MG tablet Take 40 mg by mouth    Reported, Patient   oxyCODONE-acetaminophen (PERCOCET) 5-325 MG per tablet Take by mouth  "every 4 hours as needed for moderate to severe pain    Reported, Patient   METOPROLOL TARTRATE 100 MG PO TABS ONE TABLET TWICE DAILY 6/23/10   Laverne Hayward MD        Vitals:  /84  Pulse 67  Temp 98.3  F (36.8  C) (Oral)  Ht 1.702 m (5' 7\")  Wt 84.9 kg (187 lb 3.2 oz)  BMI 29.32 kg/m2     Exam:  GENERAL APPEARANCE: alert and no distress  HENT: mouth without ulcers or lesions. Good dentition  LYMPHATICS: no cervical or supraclavicular nodes  RESP: lungs clear to auscultation - no rales, rhonchi or wheezes  CV: regular rhythm, normal rate, no rub, no murmur  EDEMA: no LE edema bilaterally  ABDOMEN: soft, nondistended, nontender  MS: extremities normal - no gross deformities noted, no evidence of inflammation in joints, no muscle tenderness  SKIN: no rash  Femoral pulses 2+ equal bilaterally  No palpable cervical, supracervical, axillary, or inguinal LAD         Again, thank you for allowing me to participate in the care of your patient.      Sincerely,    Zakia Sal PA-C      "

## 2017-11-21 NOTE — MR AVS SNAPSHOT
After Visit Summary   11/21/2017    Jonatan Foy    MRN: [id number]           Patient Information     Date Of Birth          1980        Visit Information        Provider Department      11/21/2017 3:00 PM Lyly Kiran, [facility] Solid Organ Transplant        Today's Diagnoses     Organ transplant candidate    -  1       Follow-ups after your visit        Who to contact     If you have questions or need follow up information about today's clinic visit or your schedule please contact [facility] SOLID ORGAN TRANSPLANT directly at [phone].  Normal or non-critical lab and imaging results will be communicated to you by Riskclickhart, letter or phone within 4 business days after the clinic has received the results. If you do not hear from us within 7 days, please contact the clinic through [employer] or phone. If you have a critical or abnormal lab result, we will notify you by phone as soon as possible.  Submit refill requests through [employer] or call your pharmacy and they will forward the refill request to us. Please allow 3 business days for your refill to be completed.          Additional Information About Your Visit        MyChart Information     [employer] gives you secure access to your electronic health record. If you see a primary care provider, you can also send messages to your care team and make appointments. If you have questions, please call your primary care clinic.  If you do not have a primary care provider, please call [phone] and they will assist you.        Care EveryWhere ID     This is your Care EveryWhere ID. This could be used by other organizations to access your [facility] medical records  [id number]         Blood Pressure from Last 3 Encounters:   11/21/17 150/84   08/02/17 144/78   07/19/17 (!) 178/104    Weight from Last 3 Encounters:   11/21/17 84.9 kg (187 lb 3.2 oz)   08/02/17 81.3 kg (179 lb 3.2 oz)   07/19/17 80.7 kg (178 lb)              Today, you had the  following     No orders found for display         Today's Medication Changes          These changes are accurate as of: 11/21/17 11:59 PM.  If you have any questions, ask your nurse or doctor.               Stop taking these medicines if you haven't already. Please contact your care team if you have questions.     oxyCODONE-acetaminophen 5-325 MG per tablet   Commonly known as:  PERCOCET   Stopped by:  Zakia Sal PA-C                    Primary Care Provider    Physician No Ref-Primary       NO REF-PRIMARY PHYSICIAN        Equal Access to Services     [facility] : Hadii aad ku hadasho Soomaali, waaxda luqadaha, qaybta kaalmada adeegyada, waxay idiin haymein marteg jeff mcdonough . So [facility] [phone].    ATENCIÓN: Si habla español, tiene a mcarthur disposición servicios gratuitos de asistencia lingüística. [facility] [phone].    We comply with applicable federal civil rights laws and [place] laws. We do not discriminate on the basis of race, color, national origin, age, disability, sex, sexual orientation, or gender identity.            Thank you!     Thank you for choosing [facility] SOLID ORGAN TRANSPLANT  for your care. Our goal is always to provide you with excellent care. Hearing back from our patients is one way we can continue to improve our services. Please take a few minutes to complete the written survey that you may receive in the mail after your visit with us. Thank you!             Your Updated Medication List - Protect others around you: Learn how to safely use, store and throw away your medicines at www.disposemymeds.org.          This list is accurate as of: 11/21/17 11:59 PM.  Always use your most recent med list.                   Brand Name Dispense Instructions for use Diagnosis    lisinopril 40 MG tablet    PRINIVIL/ZESTRIL     Take 40 mg by mouth        metoprolol 100 MG tablet    LOPRESSOR     ONE TABLET TWICE DAILY        minoxidil 2.5 MG tablet    LONITEN     Take 2.5 mg by mouth 2  times daily        NIFEdipine ER 90 MG Tb24    ADALAT CC     Take 90 mg by mouth daily

## 2017-12-04 ENCOUNTER — DOCUMENTATION ONLY (OUTPATIENT)
Dept: TRANSPLANT | Facility: CLINIC | Age: 37
End: 2017-12-04

## 2017-12-04 NOTE — PROGRESS NOTES
Spoke to dialysis unit for a compliance check. They say he is a very cooperative and compliant patient. He rarely misses and does not arrive late or cut his runs short. I tired to reach out to his nephrologist at [facility] to see what his thoughts were on Jonatan being ACTIVELY listed and [facility] told me to go through his dialysis unit. The dialysis unit said Dr Nobles would not be available until 12/19/2017.

## 2017-12-06 ENCOUNTER — COMMITTEE REVIEW (OUTPATIENT)
Dept: TRANSPLANT | Facility: CLINIC | Age: 37
End: 2017-12-06

## 2017-12-06 NOTE — COMMITTEE REVIEW
Abdominal Committee Review Note     Evaluation Date: 6/24/2015  Committee Review Date: 12/6/2017    Organ being evaluated for: Kidney    Transplant Phase: Evaluation  Transplant Status: Active    Transplant Coordinator: Sapna Sanz  Transplant Surgeon:   Montana Moyer    Referring Physician: Vicky Sherman    Primary Diagnosis:   Secondary Diagnosis:     Committee Review Members:  Nephrology Nadeem Ryan MD, Jose Peterson, APRN CNP, Adrian Hinojosa MD   Nutrition Ally Azevedo,    Pharmacy [facility]    - Clinical Lyly Kiran, [facility], Lyudmila John, [facility]   Transplant Zakia Sal PA-C, Coty Bower, RN, Kala Evans, RN, Tamiko Fry, RN, Zeina Santos, RN, Veornica Bass, RN, Christ Purcell MD, Sapna Sanz RN, Jeramy Saavedra MD, Tremayne Strong MD       Transplant Eligibility: Irreversible chronic kidney disease treated w/dialysis or expected need for dialysis    Committee Review Decision: Approved    Relative Contraindications: None    Absolute Contraindications: None    Committee Chair Tremayne Strong MD verbally attested to the committee's decision.    Committee Discussion Details: Reviewed patient's medical records and evaluation results. Patient has successfully completed his evaluation and compliance contract and does have the support of his nephrologist to move forward with transplant. Reviewed notes by Dr Calles. . Committee reviewed notes from radiology comparing CT from 2014 and most recent 2016 and noted the decreasing lymphadenopathy and approved moving forward.Committee has approved him to be listed as ACTIVE status Patient will be called, listing letter will be sent and patient will be added to UNOS.

## 2017-12-06 NOTE — LETTER
2017    Jonatan Foy  [address] [address]   1980    Dear Jonatan,    This letter is sent to confirm that you have completed your transplant work-up and you are a candidate in the kidney transplant program at the [facility]. You were placed on the kidney active waitlist on 2017.      When you are active on the waitlist and an organ becomes available, a coordinator will need to speak to you immediately.  You could be contacted at any time during the day or night as an organ could become available at any time.  Please make certain our office always has your current telephone numbers and address. Please keep your phone on and charged at all times. Please answer all calls even if you do not recognize the phone number and return all missed calls. The on call coordinator will not leave a message and remember you have only one hour to respond before the organ is offered to the next recipient.    Items we will need from you:      We have received approval from your insurance company for the transplant procedure.  It is critical that you notify us if there is any change in your insurance.  It is also important that you familiarize yourself with the details of your specific insurance policy.  Our patient  is available to assist you if you should have any questions regarding your coverage.    An ALA or PRA blood sample will  need to be sent here every 3 months to match you with  donors or any potential living donors.  If you need this testing, special mailing boxes (called mailers) will be sent to you directly from the Outreach Department. You should take the physician order form and the  to your home laboratory when it is time to for this testing to be done.  Additional mailers can be obtained by calling the Transplant Office and asking to speak to a kidney . As soon as you receive the  mailers take it to your dialysis unit and they will draw the labs and send in.      During this waiting period, we may request additional periodic laboratory tests with your primary physician.  It will be your responsibility to remind your physician to forward your results to the Transplant Office.      We need to be kept informed of any changes in your medical condition such as:    o changes in your medications,   o significant changes in your health  o significant infections (such as pneumonia or abscesses)  o blood transfusions  o any condition which requires hospitalization  o any surgery      Remember to complete any routine cancer screening tests required before your transplant.  This includes colonoscopy; prostate screening for men, and mammogram and gynecologic testing for women, as well as dental work.  Your primary care clinic can assist you with arranging for these exams.  Remind your caregivers to forward copies of the records and final reports. You are up to date with your health maintenance but you  should make a dental appointment for early 2018.    We want you to know that our program has physician and surgeon coverage 24 hours a day, 365 days a year. If this coverage changes or there are substantial program changes, you will be notified in writing by letter. You will now have a new transplant coordinator while on the wait list. Her name is Kala Cristina [phone] and her LPN is Janee Clark [phone].    Attached is a letter from the [employer] (UNOS). It describes the services and information offered to patients by UNOS and the Organ Procurement and Transplantation Network.    We appreciate having had the opportunity to participate in your care.  If you have questions, please feel free to call the Transplant Office at [phone] or [phone].      Sincerely,       Kidney Transplant Program    Enclosures: ALA/PRA Physician Order, Telephone Contact List, Travel  Resources, UNOS Letter, Waitlist Information Update and While You Are Waiting  CC:   Serafin Nobles, Vicky Sherman, [facility], [facility]

## 2017-12-07 ENCOUNTER — TELEPHONE (OUTPATIENT)
Dept: TRANSPLANT | Facility: CLINIC | Age: 37
End: 2017-12-07

## 2017-12-07 ENCOUNTER — MEDICAL CORRESPONDENCE (OUTPATIENT)
Dept: HEALTH INFORMATION MANAGEMENT | Facility: CLINIC | Age: 37
End: 2017-12-07

## 2017-12-07 DIAGNOSIS — Z76.82 AWAITING ORGAN TRANSPLANT: Primary | ICD-10-CM

## 2017-12-07 LAB — UNOS CPRA: 100

## 2017-12-07 NOTE — TELEPHONE ENCOUNTER
Called Jonatan to let him know his case was discussed yesterday at Selection Committee and he has been approved as a kidney transplant recipient.  He was quite happy. He will be added to UNOS  donor wait list as ACTIVE status. We reviewed what this actually means; he should keep his phone on and charged at all times and he must answer all calls even if he does not recognize the phone number. He should return any missed calls as that could be his offer. He was reminded that the on call coordinator will not leave a message and he has one hour to respond before the organ is offered to the next recipient. He will be told when to arrive at the hospital, when to stop eating and drinking, and what medications he should take. He is aware his waiting time will start with the start date of dialysis. He was also made aware that he can decline an organ offer for any reason and there is not a consequence or penalty. Reviewed that the transplant takes place in the hospital and to go through the ED entrance if he is to arrive after . We talked about the admission process, preop, surgery, PACU, intermediate care and the transplant floor. Emphasized he will be required to go to the [facility] for at least a week after hospital discharge to have labs monitored. Again, we talked about the importance of taking his medications twice a day and having labs drawn as ordered for the rest of his life. He was reminded if there are any potential living donors to have them register now. We talked about getting his PRA drawn at dialysis and sent in as soon as he receives the mailers. He did remember that process. He will be sent a listing letter, PRA orders and mailers. He is also aware he will have a new coordinator and was given her phone number.

## 2017-12-07 NOTE — TELEPHONE ENCOUNTER
Patient was added to the  donor kidney transplant wait list as ACTIVE in UNOS today 2017. His dialysis start date is 2013.Listing letter, PRA orders and mailers sent.

## 2017-12-14 ENCOUNTER — RESULTS ONLY (OUTPATIENT)
Dept: OTHER | Facility: CLINIC | Age: 37
End: 2017-12-14

## 2017-12-14 DIAGNOSIS — Z76.82 AWAITING ORGAN TRANSPLANT: ICD-10-CM

## 2017-12-20 LAB — PRA SINGLE ANTIGEN IGG ANTIBODY: NORMAL

## 2018-01-05 LAB
SA1 CELL: NORMAL
SA1 COMMENTS: NORMAL
SA1 HI RISK ABY: NORMAL
SA1 MOD RISK ABY: NORMAL
SA1 TEST METHOD: NORMAL
SA2 CELL: NORMAL
SA2 COMMENTS: NORMAL
SA2 HI RISK ABY UA: NORMAL
SA2 MOD RISK ABY: NORMAL
SA2 TEST METHOD: NORMAL

## 2018-02-15 ENCOUNTER — DOCUMENTATION ONLY (OUTPATIENT)
Dept: TRANSPLANT | Facility: CLINIC | Age: 38
End: 2018-02-15

## 2018-02-15 DIAGNOSIS — Z76.82 AWAITING ORGAN TRANSPLANT: Primary | ICD-10-CM

## 2018-02-15 NOTE — PROGRESS NOTES
PATHOLOGY HLA CROSSMATCH CONSULTATION: DONOR/RECIPIENT VIRTUAL CROSSMATCH - Kidney  Consultation Date: 2/15/2018  Consultation Requested by: Dr. Ricks  Regarding: Compatibility of  donor organ UNOS #[id number]  from OPO/Hospital: [place], [place] with patient Jonatan Foy       Findings: Regarding a virtual crossmatch between Jonatan Foy and  donor listed above (match ID [id number]):  The most recent and peak patient sera were analyzed.  The patient has 5 donor-specific antibody(ies)  (DSA) as listed in table below. No other antibodies listed with specificity against donor organ.      ANTIBODY MOST RECENT SERUM (mfi) 12.14.17 Peak Serum #1 (mfi)  9.14.16     DP2 [id number]   DP14 55784 35048   DR13 (allele specific) [id number] [phone]   DR52 (allele specific) 5642 6305   DQ6 [id number] [id number]       Record Review Indicates: I personally reviewed the most recent serum and the  peak serum/sera.  In addition, I analyzed 3 more sera:  The patient has antibodies against the donor organ.   Based on historical data from this hospitals's histocompatibility lab, using the sum mfi of the patient's DSA to antibodies with specificity against this donor organ, the probability of a positive B cell crossmatch is 98% for the most recent serum and for the peak serum.  DSA to C-locus antigens were not considered in deriving the probability of positive crossmatch because DSA to C-locus antigens rarely contribute to a positive lymphocyte crossmatch test.    The results of this virtual XM are:   -most recent serum: Incompatible  -peak #1:  Incompatible    Disclaimer: Clinical judgement must take into account other factors, such as non-HLA antibodies not detected in the assay.   The VXM gives probabilities only.  The probability does not account for the potential for auto-antibodies that may be present in the patient's serum.  These autoantibodies may render the physical crossmatch falsely positive, and would be  detected by an autologous crossmatch.  When possible, confirm findings with a prospective allogeneic and autologous flow crossmatches before going to transplant.    Annabel Ahmadi MD  Medical Director, Immunology/Histocompatibility Laboratory

## 2018-02-28 ENCOUNTER — TEAM CONFERENCE (OUTPATIENT)
Dept: TRANSPLANT | Facility: CLINIC | Age: 38
End: 2018-02-28

## 2018-02-28 ENCOUNTER — HOSPITAL ENCOUNTER (OUTPATIENT)
Facility: CLINIC | Age: 38
Setting detail: SPECIMEN
Discharge: HOME | End: 2018-02-28
Attending: INTERNAL MEDICINE | Admitting: INTERNAL MEDICINE
Payer: MEDICARE

## 2018-02-28 DIAGNOSIS — Z76.82 ORGAN TRANSPLANT CANDIDATE: Primary | ICD-10-CM

## 2018-02-28 DIAGNOSIS — N18.6 END STAGE RENAL DISEASE (H): ICD-10-CM

## 2018-02-28 PROCEDURE — 86832 HLA CLASS I HIGH DEFIN QUAL: CPT | Performed by: INTERNAL MEDICINE

## 2018-02-28 PROCEDURE — 86833 HLA CLASS II HIGH DEFIN QUAL: CPT | Performed by: INTERNAL MEDICINE

## 2018-02-28 NOTE — TELEPHONE ENCOUNTER
TEAM CONFERENCE:    ATTENDEES: Galdino Phillips Dunn, Connor, Coordinators    DISCUSSION and OUTCOME: Dr. Phillips presented pt at [facility] for Plan C review (pt cannot be smart C and would not benefit from plan B). Pt to meet with Dr. Strong in clinic to discuss plan C.

## 2018-03-01 LAB — PRA INHIBITOR: NORMAL

## 2018-03-04 ENCOUNTER — RESULTS ONLY (OUTPATIENT)
Dept: OTHER | Facility: CLINIC | Age: 38
End: 2018-03-04

## 2018-03-06 DIAGNOSIS — Z76.82 AWAITING ORGAN TRANSPLANT: ICD-10-CM

## 2018-03-07 ENCOUNTER — TELEPHONE (OUTPATIENT)
Dept: TRANSPLANT | Facility: CLINIC | Age: 38
End: 2018-03-07

## 2018-03-07 DIAGNOSIS — Z76.82 ORGAN TRANSPLANT CANDIDATE: Primary | ICD-10-CM

## 2018-03-07 DIAGNOSIS — N18.6 END STAGE RENAL DISEASE (H): ICD-10-CM

## 2018-03-07 NOTE — TELEPHONE ENCOUNTER
Spoke with patient about meeting with Dr. Strong to discuss Plan C. Patient is willing to meet but has dialysis on Tuesday, Thursday, Saturday. Scheduling to work with [place] to set a clinic time for Dr. Strong to meet with patient.

## 2018-03-09 LAB — PRA SINGLE ANTIGEN IGG ANTIBODY: NORMAL

## 2018-03-13 LAB
SA1 CELL: NORMAL
SA1 CELL: NORMAL
SA1 COMMENTS: NORMAL
SA1 COMMENTS: NORMAL
SA1 HI RISK ABY: NORMAL
SA1 HI RISK ABY: NORMAL
SA1 MOD RISK ABY: NORMAL
SA1 MOD RISK ABY: NORMAL
SA1 TEST METHOD: NORMAL
SA1 TEST METHOD: NORMAL
SA2 CELL: NORMAL
SA2 CELL: NORMAL
SA2 COMMENTS: NORMAL
SA2 COMMENTS: NORMAL
SA2 HI RISK ABY UA: NORMAL
SA2 HI RISK ABY UA: NORMAL
SA2 MOD RISK ABY: NORMAL
SA2 MOD RISK ABY: NORMAL
SA2 TEST METHOD: NORMAL
SA2 TEST METHOD: NORMAL

## 2018-03-18 ENCOUNTER — DOCUMENTATION ONLY (OUTPATIENT)
Dept: TRANSPLANT | Facility: CLINIC | Age: 38
End: 2018-03-18

## 2018-03-18 DIAGNOSIS — Z76.82 AWAITING ORGAN TRANSPLANT: Primary | ICD-10-CM

## 2018-03-18 NOTE — PROGRESS NOTES
PATHOLOGY HLA CROSSMATCH CONSULTATION: DONOR/RECIPIENT VIRTUAL CROSSMATCH - Kidney  Consultation Date: 3/18/2018  Consultation Requested by: Dr. Cage  Regarding: Compatibility of  donor organ UNOS #[id number]  from OPO/Hospital: [facility] with patient Jonatan Foy     Findings: Regarding a virtual crossmatch between Jonatan Foy and  donor listed above (match ID [id number]):  The most recent and peak patient sera were analyzed.  The patient has  donor-specific antibody(ies)  (DSA) as listed in table below. No other antibodies listed with specificity against donor organ.      ANTIBODY MOST RECENT SERUM (mfi) 3.4.18 Peak Serum #1 (mfi)  6.24.15      DQ2 - 18397   DQA*05 45844  -   DQ5 - 5000   DP16        Record Review Indicates: I personally reviewed the most recent serum and the  peak serum/sera.  In addition, I analyzed 4 more sera:  The patient has antibodies against the donor organ.   Based on historical data from this hospital's histocompatibility lab, using the sum mfi of the patient's DSA to antibodies with specificity against this donor organ, the probability of a positive B cell crossmatch is  98% for the most recent serum and  for the peak serum.  DSA to C-locus antigens were not considered in deriving the probability of positive crossmatch because DSA to C-locus antigens rarely contribute to a positive lymphocyte crossmatch test.    The results of this virtual XM are:   -most recent serum: Incompatible  -peak #1: In compatible    There is no bead for DP16. However, this patient had antibodies to a shared epitope which is present in all other DP antibodies present in his sample that we have the bead for. He is not consented for Plan C listing yet.    Disclaimer: Clinical judgement must take into account other factors, such as non-HLA antibodies not detected in the assay.   The VXM gives probabilities only.  The probability does not account for the  potential for auto-antibodies that may be present in the patient's serum.  These autoantibodies may render the physical crossmatch falsely positive, and would be detected by an autologous crossmatch.  When possible, confirm findings with a prospective allogeneic and autologous flow crossmatches before going to transplant.    Annabel Ahmadi MD  Medical Director, Immunology/Histocompatibility Laboratory

## 2018-03-19 ENCOUNTER — DOCUMENTATION ONLY (OUTPATIENT)
Dept: TRANSPLANT | Facility: CLINIC | Age: 38
End: 2018-03-19

## 2018-03-19 NOTE — PROGRESS NOTES
PATHOLOGY HLA CROSSMATCH CONSULTATION: DONOR/RECIPIENT VIRTUAL CROSSMATCH - Kidney  Consultation Date: 3/19/2018  Consultation Requested by: Dr. Cage  Regarding: Compatibility of  donor organ UNOS #[id number]  from OPO/Hospital: [facility]/[place], [place] with patient Jonatan Foy       Findings: Regarding a virtual crossmatch between Jonatan Foy and  donor listed above (match ID [id number]):  The most recent and peak patient sera were analyzed.  The patient has 4 donor-specific antibody(ies)  (DSA) as listed in table below. No other antibodies listed with specificity against donor organ.      ANTIBODY MOST RECENT SERUM (mfi) 3.4.18 Peak Serum #1 (mfi)  6.24.15     B18  - 723   DQ2 26593  -   DQA5  - 69634   DP2 72307 80651       Record Review Indicates: I personally reviewed the most recent serum and the  peak serum/sera.  In addition, I analyzed 4 more sera:  The patient has antibodies against the donor organ.   Based on historical data from this hospital's histocompatibility lab, using the sum mfi of the patient's DSA to antibodies with specificity against this donor organ, the probability of a positive B cell crossmatch is  98%  for the most recent serum and for the peak serum.  DSA to C-locus antigens were not considered in deriving the probability of positive crossmatch because DSA to C-locus antigens rarely contribute to a positive lymphocyte crossmatch test.    The results of this virtual XM are:   -most recent serum: Incompatible  -peak #1:  Incompatible    Disclaimer: Clinical judgement must take into account other factors, such as non-HLA antibodies not detected in the assay.   The VXM gives probabilities only.  The probability does not account for the potential for auto-antibodies that may be present in the patient's serum.  These autoantibodies may render the physical crossmatch falsely positive, and would be detected by an autologous crossmatch.  When  possible, confirm findings with a prospective allogeneic and autologous flow crossmatches before going to transplant.    Annabel Ahmadi MD  Medical Director, Immunology/Histocompatibility Laboratory  HPI      [facility]      Physical Exam

## 2018-06-05 ENCOUNTER — RESULTS ONLY (OUTPATIENT)
Dept: OTHER | Facility: CLINIC | Age: 38
End: 2018-06-05

## 2018-06-05 DIAGNOSIS — Z76.82 AWAITING ORGAN TRANSPLANT: ICD-10-CM

## 2018-06-13 LAB — PRA SINGLE ANTIGEN IGG ANTIBODY: NORMAL

## 2018-06-14 NOTE — PROGRESS NOTES
Patient followed in the Transplant clinic.  Transplant RN Coordinator to follow up.    Sylvie Regalado RN on 6/14/2018 at 3:15 PM

## 2018-06-19 ENCOUNTER — TELEPHONE (OUTPATIENT)
Dept: TRANSPLANT | Facility: CLINIC | Age: 38
End: 2018-06-19

## 2018-06-19 NOTE — TELEPHONE ENCOUNTER
Left message asking for return call regarding our need to understand why he could not make his appt with Dr. Strong for Plan C discussion today; this is the 3rd time he has not made his appt for this discussion.

## 2018-06-27 NOTE — TELEPHONE ENCOUNTER
Left message asking for return call regarding setting up phone mtg with Dr. Strong; have some dates/times for him to consider.

## 2018-09-04 ENCOUNTER — RESULTS ONLY (OUTPATIENT)
Dept: OTHER | Facility: CLINIC | Age: 38
End: 2018-09-04

## 2018-09-04 DIAGNOSIS — Z76.82 AWAITING ORGAN TRANSPLANT: ICD-10-CM

## 2018-09-10 LAB
PRA SINGLE ANTIGEN IGG ANTIBODY: NORMAL
SA1 CELL: NORMAL
SA1 COMMENTS: NORMAL
SA1 HI RISK ABY: NORMAL
SA1 MOD RISK ABY: NORMAL
SA1 TEST METHOD: NORMAL
SA2 CELL: NORMAL
SA2 COMMENTS: NORMAL
SA2 HI RISK ABY UA: NORMAL
SA2 MOD RISK ABY: NORMAL
SA2 TEST METHOD: NORMAL

## 2018-09-13 NOTE — PROGRESS NOTES
Patient followed in the Transplant clinic.  Transplant RN Coordinator to follow up.    Sylvie Regalado RN on 9/13/2018 at 2:39 PM

## 2018-09-27 DIAGNOSIS — Z76.82 ORGAN TRANSPLANT CANDIDATE: Primary | ICD-10-CM

## 2018-09-27 DIAGNOSIS — I10 HYPERTENSION: ICD-10-CM

## 2018-09-27 DIAGNOSIS — N18.6 ESRD (END STAGE RENAL DISEASE) (H): ICD-10-CM

## 2018-09-27 DIAGNOSIS — Z76.82 AWAITING ORGAN TRANSPLANT: ICD-10-CM

## 2018-11-29 ENCOUNTER — OFFICE VISIT (OUTPATIENT)
Dept: NEPHROLOGY | Facility: CLINIC | Age: 38
End: 2018-11-29
Attending: INTERNAL MEDICINE
Payer: MEDICARE

## 2018-11-29 ENCOUNTER — ALLIED HEALTH/NURSE VISIT (OUTPATIENT)
Dept: TRANSPLANT | Facility: CLINIC | Age: 38
End: 2018-11-29
Attending: INTERNAL MEDICINE
Payer: MEDICARE

## 2018-11-29 ENCOUNTER — RADIANT APPOINTMENT (OUTPATIENT)
Dept: CARDIOLOGY | Facility: CLINIC | Age: 38
End: 2018-11-29
Attending: INTERNAL MEDICINE
Payer: MEDICARE

## 2018-11-29 VITALS
HEART RATE: 70 BPM | DIASTOLIC BLOOD PRESSURE: 74 MMHG | TEMPERATURE: 98.5 F | OXYGEN SATURATION: 98 % | SYSTOLIC BLOOD PRESSURE: 132 MMHG | BODY MASS INDEX: 30.32 KG/M2 | HEIGHT: 67 IN | WEIGHT: 193.2 LBS

## 2018-11-29 DIAGNOSIS — Z76.82 AWAITING ORGAN TRANSPLANT: ICD-10-CM

## 2018-11-29 DIAGNOSIS — Z98.2 S/P VP SHUNT: ICD-10-CM

## 2018-11-29 DIAGNOSIS — Z76.82 ORGAN TRANSPLANT CANDIDATE: ICD-10-CM

## 2018-11-29 DIAGNOSIS — N18.6 ESRD (END STAGE RENAL DISEASE) ON DIALYSIS (H): Primary | ICD-10-CM

## 2018-11-29 DIAGNOSIS — N18.6 ESRD (END STAGE RENAL DISEASE) (H): ICD-10-CM

## 2018-11-29 DIAGNOSIS — I10 HYPERTENSION: ICD-10-CM

## 2018-11-29 DIAGNOSIS — E88.09 HYPOALBUMINEMIA: ICD-10-CM

## 2018-11-29 DIAGNOSIS — Z99.2 ESRD (END STAGE RENAL DISEASE) ON DIALYSIS (H): Primary | ICD-10-CM

## 2018-11-29 DIAGNOSIS — Z94.0 KIDNEY REPLACED BY TRANSPLANT: ICD-10-CM

## 2018-11-29 DIAGNOSIS — Z76.82 AWAITING ORGAN TRANSPLANT STATUS: Primary | ICD-10-CM

## 2018-11-29 DIAGNOSIS — Q60.0 CONGENITAL SOLITARY KIDNEY: ICD-10-CM

## 2018-11-29 PROCEDURE — G0463 HOSPITAL OUTPT CLINIC VISIT: HCPCS

## 2018-11-29 PROCEDURE — 36415 COLL VENOUS BLD VENIPUNCTURE: CPT | Performed by: PHYSICIAN ASSISTANT

## 2018-11-29 ASSESSMENT — PAIN SCALES - GENERAL: PAINLEVEL: NO PAIN (0)

## 2018-11-29 NOTE — PROGRESS NOTES
Patient Name: Jonatan Foy  : 1980  Age: 38 year old  MRN: [id number]  Date of Initial Social Work Evaluation:     Patient on kidney transplant wait list. Saw pt today to update psychosocial assessment.      Presenting Information   Living Situation: Lives in a house with his daughter Shweta (14), his ex-wife Renetta, her  Renato and their two kids in [place], [place].   If not local, plans for short term stay:  NA   Previous Functional Status: Independent. Pt goes to dialysis at [facility] //. Pt does not have a license and uses medical transportation to get to/from dialysis. Family transports otherwise.   Cultural/Language/Spiritual Considerations: English-speaking     Support System  Primary Support Person Pt's ex-wife (Renetta) and her  (Renato). Renetta works during the night, Renato during the day, so someone will be around at all times.   Other support:  Pt has a 14-year-old daughter that lives with them. Pt's father and sister live in [place], [place] and are able to help at times.   Plan for support in immediate post-transplant period: Ex-wife and her .     Health Care Directive  Decision Maker: Patient   Alternate Decision Maker: Pt's father   Health Care Directive: None filed. Provided education.     Mental Health/Coping:   History of Mental Health: Pt denied concerns. None noted in chart review.   History of Chemical Health: Pt stated that he does not drink alcohol or use drugs.   Current status: No concerns noted.   Coping: Pt appeared to be coping appropriately.   Services Needed/Recommended: None at this time.     Financial   Income: Pt has been on SSDI since  due to ESRD. 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   Impact of transplant on income: Discussed possible loss of eligibility for SSDI post-transplant.   Insurance and medication coverage: Medicare and are [place]. Pt also reported having an AARP plan.   Financial concerns: None identified.    Resources needed: None identified.     Assessment and recommendations and plan:  Discussed compliance due to compliance contract noted in previous assessment. Pt noted that he continues to be compliant, attends dialysis regularly and takes his medications regularly. Pt did miss appts this morning due to his medical transportation being late, but denied this being an ongoing issue. Reviewed transplant education (Medicare, rehabilitation, donor issues, community/financial resources, and psych/family adjustment) as well as psychosocial risks of transplant. Provided patient with a copy of post-transplant informational sheet that includes information on potential costs of medications, Medicare ESRD, post-transplant lodging, etc. Patient seemed to process information well. Appeared well informed, motivated, and able to follow post transplant requirements. Behavior was appropriate during interview. Has adequate income and insurance coverage. Adequate social support. No major contraindications noted for transplant. At this time, patient appears to understand the risks and benefits of transplant.     SALAZAR Mitchell, [facility]  - Coverage for Lyly Kiran   Outpatient Kidney/Pancreas/Auto Islet Transplant   Ph. [phone]  [email or web address]     NO LETTER

## 2018-11-29 NOTE — MR AVS SNAPSHOT
"              After Visit Summary   11/29/2018    Jonatan Foy    MRN: [id number]           Patient Information     Date Of Birth          1980        Visit Information        Provider Department      11/29/2018 2:00 PM Jose Peterson APRN CNP [facility]        Today's Diagnoses     Hypoalbuminemia    -  1       Follow-ups after your visit        Who to contact     If you have questions or need follow up information about today's clinic visit or your schedule please contact [facility] directly at [phone].  Normal or non-critical lab and imaging results will be communicated to you by [employer], letter or phone within 4 business days after the clinic has received the results. If you do not hear from us within 7 days, please contact the clinic through [employer] or phone. If you have a critical or abnormal lab result, we will notify you by phone as soon as possible.  Submit refill requests through [employer] or call your pharmacy and they will forward the refill request to us. Please allow 3 business days for your refill to be completed.          Additional Information About Your Visit        MyChart Information     [employer] gives you secure access to your electronic health record. If you see a primary care provider, you can also send messages to your care team and make appointments. If you have questions, please call your primary care clinic.  If you do not have a primary care provider, please call [phone] and they will assist you.        Care EveryWhere ID     This is your Care EveryWhere ID. This could be used by other organizations to access your [facility] medical records  [id number]        Your Vitals Were     Pulse Temperature Height Pulse Oximetry BMI (Body Mass Index)       70 98.5  F (36.9  C) (Oral) 1.702 m (5' 7\") 98% 30.26 kg/m2        Blood Pressure from Last 3 Encounters:   11/29/18 132/74   11/21/17 150/84   08/02/17 144/78    Weight from Last 3 Encounters:   11/29/18 87.6 " kg (193 lb 3.2 oz)   11/21/17 84.9 kg (187 lb 3.2 oz)   08/02/17 81.3 kg (179 lb 3.2 oz)              We Performed the Following     Albumin level        Primary Care Provider Fax #    Physician No Ref-Primary [phone]       No address on file        Equal Access to Services     ROSY BRAY : Hadii aad ku hadasho Soomaali, waaxda luqadaha, qaybta kaalmada adewilbertyada, waxjosé evan honeylianne cevalloswilbert aguilar sharonda . So [facility] [phone].    ATENCIÓN: Si habla español, tiene a mcarthur disposición servicios gratuitos de asistencia lingüística. Llame al [phone].    We comply with applicable federal civil rights laws and [place] laws. We do not discriminate on the basis of race, color, national origin, age, disability, sex, sexual orientation, or gender identity.            Thank you!     Thank you for choosing [facility]  for your care. Our goal is always to provide you with excellent care. Hearing back from our patients is one way we can continue to improve our services. Please take a few minutes to complete the written survey that you may receive in the mail after your visit with us. Thank you!             Your Updated Medication List - Protect others around you: Learn how to safely use, store and throw away your medicines at www.disposemymeds.org.          This list is accurate as of 11/29/18  2:12 PM.  Always use your most recent med list.                   Brand Name Dispense Instructions for use Diagnosis    lisinopril 40 MG tablet    PRINIVIL/ZESTRIL     Take 40 mg by mouth        metoprolol tartrate 100 MG tablet    LOPRESSOR     ONE TABLET TWICE DAILY        minoxidil 2.5 MG tablet    LONITEN     Take 2.5 mg by mouth 2 times daily        NIFEdipine ER 90 MG 24 hr tablet    ADALAT CC     Take 90 mg by mouth daily

## 2018-11-29 NOTE — PROGRESS NOTES
Assessment and Plan:  1. Kidney transplant wait list evaluation - Mr. Foy is a good candidate overall. He should be active on the wait list.  2. ESKD from congenital solitary kidney s/p LDKT June 2007 failed due to rejection in the setting of non-compliance with medications - although hemodialysis has been going well since 2013, he would likely benefit from a kidney transplant.   3. Cardiac risk - LHC in 2016 showed no CAD. He has history of pulmonary hypertension with a RHC in 12/2016 showing a mean PAP of 38 mmHg, which was suspected by Dr. Taylor to be from fluid overload. His dry weight was challenged and a repeat RHC in 7/2017 showed mean PAP of 35. ECHO today showed moderate pulmonary hypertension with a RVSP of 47.9 mmHg. Discussed case with Dr. Hinojosa, and from a pulmonary hypertension perspective, he is okay for kidney transplant. However, CO noted to be 12 on last RHC, therefore likely needs mitigation of fistula. Will discuss with primary nephrology.  He also missed his appt today with GEORGINA Forte due to transportation issues, and needs to reschedule this.   4. Lymphadenopathy - noted to be smaller in size on a 2016 CT when compared to previous 2014 CT. LDH and EBV negative. No further follow up per transplant committee   5.  History of non-compliance - he successfully completed a compliance contract and has the support of his nephrologist.  6. History of pseudotumor cerebri s/p  shunt - last checked by outside neurosurgery with [facility] in 4/2015 when he was having headaches when he initiated dialysis. No longer having headaches.    7. Health maintenance - UTD.        Discussed the risks and benefits of a transplant, including the risk of surgery and immunosuppression medications.    KDPI: We discussed approximate remaining wait time and how that is influenced by issues such as blood type and sensitization (PRA) and access to a living donor. I contrasted potential waiting time for living vs   donor kidneys from  normal (0-85%) or higher (%) kidney donor profile index (KDPI) donors and their associated outcomes. I would not recommend Mr. Foy to consider kidneys from high KDPI donors. The reason for this decision is best summarized as: improved long term graft survival.    Patient s overall re-evaluation may require further discussion in the Transplant Program s multidisciplinary selection committee for a final recommendation on the patient s suitability for transplant.     Reason for Visit:  Jonatan Foy is a 38 year old male with ESKD from congenital solitary kidney s/p LDKT 2007 failed due to rejection , who presents for Kidney transplant wait list evaluation.     Last Evaluation Clinic Visit Date:  2017 ([facility])       Wait List Date: 2013  Current phase/status: Waitlist: Active as of 2017  Transplant coordinator: Kala Evans Transplant Office phone number [phone]     Previous Medical Issues:  N/A    HPI: Mr. Foy is a 38-year-old that presents for wait list evaluation with PMH of ESKD from congenital solitary kidney s/p LDKT 2007 failed due to rejection in the setting of non-compliance with medications and back on dialysis since , pulmonary hypertension, lymphadenopathy  (noted to be smaller in size on a  CT when compared to previous , no further follow up per transplant committee), noncompliance (successfully completed compliance contract), and a pseudotumor cerebri s/p  shunt .     Interim events/issues/events    ESKD   Reportedly dialysis has been going well. Hypertension is well controlled with antihypertensive therapy x 4 with (BPs averaging 130/50 mmHg).  He denies presyncope and syncope. He is anuric. He reports he has no new potential donors at this time. Continues to follow with Dr. Nobles.     Cardiac    - ECHO, EF 60-65%  2016 - ECHO, EF 60-65%, RSVP 58 mmHg plus right atrial pressure.   2016 - RHC showed a mean  PAP of 38 mmHg (suspected by Dr. Taylor to be from fluid overload, dry weight challenged). LHC showed no evidence of angiographic disease.   7/2017 - repeat RHC showed mean PAP of 35.   2018 - ECHO today showed an EF of 55-60%, moderate mitral insufficiency (unclear etiology, possibly from restricted posterior leaflet), moderate tricuspid insufficiency, moderate pulmonary hypertension with a RVSP of 47.9 mmHg plus RA pressure.     He reportedly walks 3-4 miles per day with paper delivery, and with that he denies chest pain, shortness of breath or claudication symptoms. He missed his cardiology appt today, due to a transportation issue and needs to reschedule. He remains on disability. He lives in [place], [place] with his ex-wife, her  and her 14-year-old daughter. He reports good compliance, he reports he never misses dialysis and always takes his medications as he should. Overall, he is feeling fairly well. He denies fatigue, nausea, vomiting or poor appetite.  Since he was last seen in 2017, he has not had any blood transfusions, admits, or infections.         Kidney Disease Hx       Kidney Disease Dx:solitary kidney s/p LDKT June 2007 failed due to rejection         Biopsy Proven: No        On Dialysis: Yes, Date initiated: 2013 and Dialysis Type: [facility];       Primary Nephrologist: Dr. Nobles           [place] Symptoms: Fatigue: No; Nausea: No; Poor Appetite: No;          Potential Donor(s): No          Cardiac history:       Last cardiac risk assessment: today        Last stress test/coronary angiogram: never done 12/2016, no CAD.        Exertional symptoms: none        Recent cardiac events: none      Pertinent issues:   Blood transfusion: Yes; years ago  Jehovah s Witness: No  Pregnancies: No  Previous transplant: Yes; 2007  Urine output: No  Bladder dysfunction: No  Claudication: No  Previous amputation: No  Cancer: No  Recurrent infection: No  Chronic anticoagulation: No      ROS:  A comprehensive  review of systems was obtained and negative, except as noted in the HPI or PMH.     PMH:  Medical records were obtained and reviewed.  Past Medical History:   Diagnosis Date     Atrial fibrillation (H)      Congenital solitary kidney      ESRD (end stage renal disease) on dialysis (H)      History of blood transfusion      History of myocardial infarction 08/08/2006     HTN (hypertension)      Kidney replaced by transplant 2007    living donor transplant     Pseudotumor cerebri      Pulmonary hypertension      Shingles November 2014        PSH:  Past Surgical History:   Procedure Laterality Date     HC LEFT HEART CATHETERIZATION  08/08/2006    normal coronaries, cardiomegaly, pericardial tamponade     TRANSPLANT KIDNEY RECIPIENT LIVING UNRELATED  2007    [facility]     VENTRICLE SHUNT, ABDOMEN  1997    [facility] - Related to Pseudotumor cerebri        Personal Hx:  Social History     Social History     Marital status:      Spouse name: N/A     Number of children: N/A     Years of education: N/A     Occupational History     Not on file.     Social History Main Topics     Smoking status: Never Smoker     Smokeless tobacco: Never Used     Alcohol use No     Drug use: No     Sexual activity: Not on file     Other Topics Concern     Parent/Sibling W/ Cabg, Mi Or Angioplasty Before 65f 55m? No     Social History Narrative        Allergies:  Allergies   Allergen Reactions     Tramadol Other (See Comments) and Anaphylaxis     Due to kidney transplant     Codeine Hives     Ibu [Nsaids] Other (See Comments)     Due to kidney transplant     Morphine GI Disturbance        Medications:  Prior to Admission medications    Medication Sig Start Date End Date Taking? Authorizing Provider   lisinopril (PRINIVIL,ZESTRIL) 40 MG tablet Take 40 mg by mouth    Reported, Patient   METOPROLOL TARTRATE 100 MG PO TABS ONE TABLET TWICE DAILY 6/23/10   Laverne Hayward MD   minoxidil (LONITEN) 2.5 MG tablet Take 2.5 mg by mouth 2 times  daily    Reported, Patient   NIFEdipine (ADALAT CC) 90 MG TB24 Take 90 mg by mouth daily    Reported, Patient        Vitals:  There were no vitals taken for this visit.     Exam:  GENERAL APPEARANCE: alert and no distress  HENT: mouth without ulcers or lesions. Fair dentition. No signs of oral or dental infection.   LYMPHATICS: no cervical or supraclavicular nodes  RESP: lungs clear to auscultation - no rales, rhonchi or wheezes  CV: regular rhythm, normal rate, no rub, no murmur  FEMORAL PULSES: 2+ equal bilaterally.   EDEMA: no LE edema bilaterally  ABDOMEN: soft, nondistended, nontender, bowel sounds normal  MS: extremities normal - no gross deformities noted, no evidence of inflammation in joints, no muscle tenderness  SKIN: no rash     Results:  Orders Only on 09/04/2018   Component Date Value Ref Range Status     PRA Single Antigen IgG Antibody 09/04/2018 Specimen received - Immunology report to follow upon completion.   Final

## 2018-11-29 NOTE — MR AVS SNAPSHOT
After Visit Summary   11/29/2018    Jonatan Foy    MRN: [id number]           Patient Information     Date Of Birth          1980        Visit Information        Provider Department      11/29/2018 1:00 PM Mira Marcano LICSW [facility] Solid Organ Transplant        Today's Diagnoses     Awaiting organ transplant status    -  1       Follow-ups after your visit        Who to contact     If you have questions or need follow up information about today's clinic visit or your schedule please contact [facility] SOLID ORGAN TRANSPLANT directly at [phone].  Normal or non-critical lab and imaging results will be communicated to you by KeyOwnerhart, letter or phone within 4 business days after the clinic has received the results. If you do not hear from us within 7 days, please contact the clinic through Rx Network[employer] or phone. If you have a critical or abnormal lab result, we will notify you by phone as soon as possible.  Submit refill requests through V[employer]el (Internap) or call your pharmacy and they will forward the refill request to us. Please allow 3 business days for your refill to be completed.          Additional Information About Your Visit        MyChart Information     V[employer]el (Internap) gives you secure access to your electronic health record. If you see a primary care provider, you can also send messages to your care team and make appointments. If you have questions, please call your primary care clinic.  If you do not have a primary care provider, please call [phone] and they will assist you.        Care EveryWhere ID     This is your Care EveryWhere ID. This could be used by other organizations to access your [place] medical records  [id number]         Blood Pressure from Last 3 Encounters:   11/29/18 132/74   11/21/17 150/84   08/02/17 144/78    Weight from Last 3 Encounters:   11/29/18 87.6 kg (193 lb 3.2 oz)   11/21/17 84.9 kg (187 lb 3.2 oz)   08/02/17 81.3 kg (179 lb 3.2 oz)              Today, you had the  following     No orders found for display       Primary Care Provider Fax #    Physician No Ref-Primary [phone]       No address on file        Equal Access to Services     ROSY BRAY : Hadii aad ku hadcelsa Del Valle, althea dow, michael campuzano, anuel [facility] [phone].    ATENCIÓN: Si habla español, tiene a mcarthur disposición servicios gratuitos de asistencia lingüística. Llame al [phone].    We comply with applicable federal civil rights laws and [place] laws. We do not discriminate on the basis of race, color, national origin, age, disability, sex, sexual orientation, or gender identity.            Thank you!     Thank you for choosing [facility] SOLID ORGAN TRANSPLANT  for your care. Our goal is always to provide you with excellent care. Hearing back from our patients is one way we can continue to improve our services. Please take a few minutes to complete the written survey that you may receive in the mail after your visit with us. Thank you!             Your Updated Medication List - Protect others around you: Learn how to safely use, store and throw away your medicines at www.disposemymeds.org.          This list is accurate as of 11/29/18 11:59 PM.  Always use your most recent med list.                   Brand Name Dispense Instructions for use Diagnosis    lisinopril 40 MG tablet    PRINIVIL/ZESTRIL     Take 40 mg by mouth        metoprolol tartrate 100 MG tablet    LOPRESSOR     ONE TABLET TWICE DAILY        minoxidil 2.5 MG tablet    LONITEN     Take 2.5 mg by mouth 2 times daily        NIFEdipine ER 90 MG 24 hr tablet    ADALAT CC     Take 90 mg by mouth daily

## 2018-11-29 NOTE — NURSING NOTE
"Chief Complaint   Patient presents with     RECHECK     [facility] annual f/u patient on waitlist for 5+ years     /74  Pulse 70  Temp 98.5  F (36.9  C) (Oral)  Ht 1.702 m (5' 7\")  Wt 87.6 kg (193 lb 3.2 oz)  SpO2 98%  BMI 30.26 kg/m2  Carie Monique CMA    "

## 2018-11-29 NOTE — LETTER
11/29/2018       RE: Jonatan Foy  [facility] [facility] [address]     Dear Colleague,    Thank you for referring your patient, Jonatan Foy, to the [facility] NEPHROLOGY at [facility]. Please see a copy of my visit note below.    Assessment and Plan:  1. Kidney transplant wait list evaluation - Mr. Foy is a good candidate overall. He should be active on the wait list.  2. ESKD from congenital solitary kidney s/p LDKT June 2007 failed due to rejection in the setting of non-compliance with medications - although hemodialysis has been going well since 2013, he would likely benefit from a kidney transplant.   3. Cardiac risk - LHC in 2016 showed no CAD. He has history of pulmonary hypertension with a RHC in 12/2016 showing a mean PAP of 38 mmHg, which was suspected by Dr. Taylor to be from fluid overload. His dry weight was challenged and a repeat RHC in 7/2017 showed mean PAP of 35. ECHO today showed moderate pulmonary hypertension with a RVSP of 47.9 mmHg. Discussed case with Dr. Hinojosa, and from a pulmonary hypertension perspective, he is okay for kidney transplant. However, CO noted to be 12 on last RHC, therefore likely needs mitigation of fistula. Will discuss with primary nephrology.  He also missed his appt today with GEORGINA Forte due to transportation issues, and needs to reschedule this.   4. Lymphadenopathy - noted to be smaller in size on a 2016 CT when compared to previous 2014 CT. LDH and EBV negative. No further follow up per transplant committee   5.  History of non-compliance - he successfully completed a compliance contract and has the support of his nephrologist.  6. History of pseudotumor cerebri s/p  shunt - last checked by outside neurosurgery with [facility] in 4/2015 when he was having headaches when he initiated dialysis. No longer having headaches.    7. Health maintenance - UTD.        Discussed the risks and benefits of a transplant,  including the risk of surgery and immunosuppression medications.    KDPI: We discussed approximate remaining wait time and how that is influenced by issues such as blood type and sensitization (PRA) and access to a living donor. I contrasted potential waiting time for living vs  donor kidneys from  normal (0-85%) or higher (%) kidney donor profile index (KDPI) donors and their associated outcomes. I would not recommend Mr. Foy to consider kidneys from high KDPI donors. The reason for this decision is best summarized as: improved long term graft survival.    Patient s overall re-evaluation may require further discussion in the Transplant Program s multidisciplinary selection committee for a final recommendation on the patient s suitability for transplant.     Reason for Visit:  Jonatan Foy is a 38 year old male with ESKD from congenital solitary kidney s/p LDKT 2007 failed due to rejection , who presents for Kidney transplant wait list evaluation.     Last Evaluation Clinic Visit Date:  2017 ([facility])       Wait List Date: 2013  Current phase/status: Waitlist: Active as of 2017  Transplant coordinator: Kala Evans Transplant Office phone number [phone]     Previous Medical Issues:  N/A    HPI: Mr. Foy is a 38-year-old that presents for wait list evaluation with PMH of ESKD from congenital solitary kidney s/p LDKT 2007 failed due to rejection in the setting of non-compliance with medications and back on dialysis since , pulmonary hypertension, lymphadenopathy  (noted to be smaller in size on a  CT when compared to previous , no further follow up per transplant committee), noncompliance (successfully completed compliance contract), and a pseudotumor cerebri s/p  shunt .     Interim events/issues/events    ESKD   Reportedly dialysis has been going well. Hypertension is well controlled with antihypertensive therapy x 4 with (BPs averaging 130/50  mmHg).  He denies presyncope and syncope. He is anuric. He reports he has no new potential donors at this time. Continues to follow with Dr. Nobles.     Cardiac   2015 - ECHO, EF 60-65%  7/2016 - ECHO, EF 60-65%, RSVP 58 mmHg plus right atrial pressure.   12/2016 - RHC showed a mean PAP of 38 mmHg (suspected by Dr. Taylor to be from fluid overload, dry weight challenged). LHC showed no evidence of angiographic disease.   7/2017 - repeat RHC showed mean PAP of 35.   2018 - ECHO today showed an EF of 55-60%, moderate mitral insufficiency (unclear etiology, possibly from restricted posterior leaflet), moderate tricuspid insufficiency, moderate pulmonary hypertension with a RVSP of 47.9 mmHg plus RA pressure.     He reportedly walks 3-4 miles per day with paper delivery, and with that he denies chest pain, shortness of breath or claudication symptoms. He missed his cardiology appt today, due to a transportation issue and needs to reschedule. He remains on disability. He lives in [place], [place] with his ex-wife, her  and her 14-year-old daughter. He reports good compliance, he reports he never misses dialysis and always takes his medications as he should. Overall, he is feeling fairly well. He denies fatigue, nausea, vomiting or poor appetite.  Since he was last seen in 2017, he has not had any blood transfusions, admits, or infections.         Kidney Disease Hx       Kidney Disease Dx:solitary kidney s/p LDKT June 2007 failed due to rejection         Biopsy Proven: No        On Dialysis: Yes, Date initiated: 2013 and Dialysis Type: [facility];       Primary Nephrologist: Dr. Nobles           Uremic Symptoms: Fatigue: No; Nausea: No; Poor Appetite: No;          Potential Donor(s): No          Cardiac history:       Last cardiac risk assessment: today        Last stress test/coronary angiogram: never done 12/2016, no CAD.        Exertional symptoms: none        Recent cardiac events: none      Pertinent issues:   Blood  transfusion: Yes; years ago  Jehovah s Witness: No  Pregnancies: No  Previous transplant: Yes; 2007  Urine output: No  Bladder dysfunction: No  Claudication: No  Previous amputation: No  Cancer: No  Recurrent infection: No  Chronic anticoagulation: No      ROS:  A comprehensive review of systems was obtained and negative, except as noted in the HPI or PMH.     PMH:  Medical records were obtained and reviewed.  Past Medical History:   Diagnosis Date     Atrial fibrillation (H)      Congenital solitary kidney      ESRD (end stage renal disease) on dialysis (H)      History of blood transfusion      History of myocardial infarction 08/08/2006     HTN (hypertension)      Kidney replaced by transplant 2007    living donor transplant     Pseudotumor cerebri      Pulmonary hypertension      Shingles November 2014        PSH:  Past Surgical History:   Procedure Laterality Date     HC LEFT HEART CATHETERIZATION  08/08/2006    normal coronaries, cardiomegaly, pericardial tamponade     TRANSPLANT KIDNEY RECIPIENT LIVING UNRELATED  2007    [facility]     VENTRICLE SHUNT, ABDOMEN  1997    [facility] - Related to Pseudotumor cerebri        Personal Hx:  Social History     Social History     Marital status:      Spouse name: N/A     Number of children: N/A     Years of education: N/A     Occupational History     Not on file.     Social History Main Topics     Smoking status: Never Smoker     Smokeless tobacco: Never Used     Alcohol use No     Drug use: No     Sexual activity: Not on file     Other Topics Concern     Parent/Sibling W/ Cabg, Mi Or Angioplasty Before 65f 55m? No     Social History Narrative        Allergies:  Allergies   Allergen Reactions     Tramadol Other (See Comments) and Anaphylaxis     Due to kidney transplant     Codeine Hives     Ibu [Nsaids] Other (See Comments)     Due to kidney transplant     Morphine GI Disturbance        Medications:  Prior to Admission medications    Medication Sig Start Date End Date  Taking? Authorizing Provider   lisinopril (PRINIVIL,ZESTRIL) 40 MG tablet Take 40 mg by mouth    Reported, Patient   METOPROLOL TARTRATE 100 MG PO TABS ONE TABLET TWICE DAILY 6/23/10   Laverne Hayward MD   minoxidil (LONITEN) 2.5 MG tablet Take 2.5 mg by mouth 2 times daily    Reported, Patient   NIFEdipine (ADALAT CC) 90 MG TB24 Take 90 mg by mouth daily    Reported, Patient        Vitals:  There were no vitals taken for this visit.     Exam:  GENERAL APPEARANCE: alert and no distress  HENT: mouth without ulcers or lesions. Fair dentition. No signs of oral or dental infection.   LYMPHATICS: no cervical or supraclavicular nodes  RESP: lungs clear to auscultation - no rales, rhonchi or wheezes  CV: regular rhythm, normal rate, no rub, no murmur  FEMORAL PULSES: 2+ equal bilaterally.   EDEMA: no LE edema bilaterally  ABDOMEN: soft, nondistended, nontender, bowel sounds normal  MS: extremities normal - no gross deformities noted, no evidence of inflammation in joints, no muscle tenderness  SKIN: no rash     Results:  Orders Only on 09/04/2018   Component Date Value Ref Range Status     PRA Single Antigen IgG Antibody 09/04/2018 Specimen received - Immunology report to follow upon completion.   Final       Again, thank you for allowing me to participate in the care of your patient.      Sincerely,    GEORGINA Joiner CNP

## 2018-12-04 DIAGNOSIS — Z76.82 AWAITING ORGAN TRANSPLANT: ICD-10-CM

## 2018-12-04 PROCEDURE — 86833 HLA CLASS II HIGH DEFIN QUAL: CPT | Performed by: TRANSPLANT SURGERY

## 2018-12-04 PROCEDURE — 86832 HLA CLASS I HIGH DEFIN QUAL: CPT | Performed by: TRANSPLANT SURGERY

## 2018-12-06 LAB
PROTOCOL CUTOFF: NORMAL
SA1 CELL: NORMAL
SA1 COMMENTS: NORMAL
SA1 HI RISK ABY: NORMAL
SA1 MOD RISK ABY: NORMAL
SA1 TEST METHOD: NORMAL
SA2 CELL: NORMAL
SA2 COMMENTS: NORMAL
SA2 HI RISK ABY UA: NORMAL
SA2 MOD RISK ABY: NORMAL
SA2 TEST METHOD: NORMAL
UNACCEPTABLE ANTIGEN: NORMAL
UNOS CPRA: 100

## 2019-01-02 ENCOUNTER — OFFICE VISIT (OUTPATIENT)
Dept: CARDIOLOGY | Facility: CLINIC | Age: 39
End: 2019-01-02
Attending: NURSE PRACTITIONER
Payer: MEDICARE

## 2019-01-02 VITALS
OXYGEN SATURATION: 100 % | SYSTOLIC BLOOD PRESSURE: 146 MMHG | HEART RATE: 66 BPM | HEIGHT: 67 IN | BODY MASS INDEX: 29.87 KG/M2 | DIASTOLIC BLOOD PRESSURE: 84 MMHG | WEIGHT: 190.3 LBS

## 2019-01-02 DIAGNOSIS — I10 ESSENTIAL HYPERTENSION: ICD-10-CM

## 2019-01-02 DIAGNOSIS — I27.20 PULMONARY HYPERTENSION (H): Primary | ICD-10-CM

## 2019-01-02 PROCEDURE — G0463 HOSPITAL OUTPT CLINIC VISIT: HCPCS

## 2019-01-02 PROCEDURE — 99214 OFFICE O/P EST MOD 30 MIN: CPT | Mod: ZP | Performed by: PHYSICIAN ASSISTANT

## 2019-01-02 PROCEDURE — 93010 ELECTROCARDIOGRAM REPORT: CPT | Mod: ZP | Performed by: INTERNAL MEDICINE

## 2019-01-02 PROCEDURE — 93005 ELECTROCARDIOGRAM TRACING: CPT | Mod: ZF

## 2019-01-02 ASSESSMENT — MIFFLIN-ST. JEOR: SCORE: 1746.83

## 2019-01-02 ASSESSMENT — PAIN SCALES - GENERAL: PAINLEVEL: NO PAIN (0)

## 2019-01-02 NOTE — NURSING NOTE
Vitals completed successfully and medication reconciled. EKG done.   Sherly Fisher CMA  10:50 AM

## 2019-01-02 NOTE — PROGRESS NOTES
HPI: Jonatan Foy is a 38 year old male with congenital solitary kidney s/p living donor kidney transplant in 2017 failed secondary to rejection, back on dialysis since 2013 who presents today for cardiology evaluation for kidney transplant waitlist.    He has undergone a prior coronary angiogram in 2016 which showed no significant CAD. There has been note of pulmonary hypertension. In 2016 on RHC it was reported as a mean of 38mmHg, felt to be secondary to fluid overload. His dry weight was challenged and repeat in 2017 was mean 35mmHg. Of note, his cardiac output on this RHC was 12-13.    He dialyzes on T-Th-Sat with [employer], runs for 3:45- no issues with hypotension. He denies any chest pain, palpitations, dyspnea. Dry weight now 83kg, has been dropping.Mild leg edema- worse with standing for prolonged periods. Mild abdominal bloating unchanged. No PND, orthopnea. Sleeps in bed- 2 pillows, not secondary to orthopnea. No history of sleep apnea. He considers himself very active. Says he can do >2 flights stairs and more than several blocks ambulation without issue. Does not take aspirin.     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 Lives in [place] with his ex-wife, her  and child. No smoking history. No ETOH.     Family History:  No family history of premature CAD. Mom  3 years ago- prior smoker, recurrent pneumonia, lung collapsed  Dad with Hypertension    Medications:  Current Outpatient Medications   Medication Sig     lisinopril (PRINIVIL,ZESTRIL) 40 MG tablet Take 40 mg by mouth     METOPROLOL TARTRATE 100 MG PO TABS ONE TABLET TWICE DAILY      "minoxidil (LONITEN) 2.5 MG tablet Take 2.5 mg by mouth 2 times daily     NIFEdipine (ADALAT CC) 90 MG TB24 Take 90 mg by mouth daily     No current facility-administered medications for this visit.      Review of Systems:  No fever, chills, night sweats  No cough or dyspnea  Cardiac as above  Bloating as above, No melena or hematochezia  No urine output    Physical Exam:   /84 (BP Location: Right arm, Patient Position: Chair, Cuff Size: Adult Regular)   Pulse 66   Ht 1.702 m (5' 7\")   Wt 86.3 kg (190 lb 4.8 oz)   SpO2 100%   BMI 29.81 kg/m    Gen: seated in chair, in NAD  HEENT: Nc/AT, sclera anicteric, MMM  Pulm: CTA B, no wheezing or rales  CV: RRR, 3/6 murmur noted likely secondary to large fistula  Abd: ND, +BS, soft  Ext: 1+ edema. L forearm large fistula noted  Neuro: alert and conversant, moving all extremities  Psych: pleasant mood and affect    Data:  EKG today: NSR, rate 60s, normal R wave progression, no ST-T changes.     Echocardiogram 11/29/2018: Global LV function normal, EF 55-60%. Moderate MR- etiology unclear but possibly with restricted posterior leaflet. Global RV function normal. Severe LAE, moderate JERRY. Moderate TR, moderate pulmonary hypertension. RVSP is 47.9+RAP.  Dilated IVC with abnormal respiratory variation. Estimate RAP of 15mmHg.     RHC 7/2017: mean RA 11mmHg, mean PA 35mmHg, wedge 20mmHg  RHC 12/2016: mean RA 16mmHg, mean PA 38mmHg, wedge 23mmHg.     CORONARY ANGIOGRAM 12/21/2016:    Both coronary arteries arise from their respective cusps. Right dominant. Left main is large without any angiographic evidence of disease. LAD is a large type 3 vessel giving rise to septal perforators, a large D1 and small D2 and D3.  There is no angiographic evidence of disease in any segment. LCX is a large vessel giving rise to small OM1 and large OM2 and OM3 vessels.  The pLCx has a moderate focal calcification with diffuse minimal luminal irregularities.  The mLCx, dLCx, OM2 and OM3 have " no angiographic evidence of disease. RCA is a large vessel giving rise to large RPDA and RPL branches.  There is no angiographic evidence of disease.      Assessment and Plan: This is a 38 year old male with solitary kidney, prior kidney transplant s/p rejection back on hemodialysis for 5 years, with prior pulmonary hypertension noted- felt to be secondary to volume overload who presents today for annual cardiac evaluation for kidney transplant waitlist. He is stable from a cardiovascular disease standpoint with no evidence of unstable angina, decompensated heart failure or significant arrhythmias. He can achieve 4 METS without difficulty and has a prior coronary angiogram from 2 years ago without significant disease. Given his risk is low and he's had recent (<3 years) evaluation of his coronary arteries I do not think he needs additional testing at this time with stress testing.    As for the pulmonary hypertension, his echo again shows volume overload, it was done according to him the morning of a non dialysis day. Would recommend to challenge his dry weight. He had evaluation last year with right heart cath which showed PA pressures elevated secondary to elevated left side pressures. I don't see use in repeating this year though if he continues to be volume overloaded on the left he can with time develop pre capillary pulmonary hypertension as well so again I would try to get more aggressive with volume removal.     Other medications were reviewed- recommend continuation of beta blocker perioperatively.     I believe the patient may proceed with transplant at an acceptable perioperative risk. I have notified his team, please don't hesitate to ask if you have questions or concerns.     Recommend ongoing evaluation and clinical follow-up annually if still on the transplant list.    Bety Wong

## 2019-01-02 NOTE — LETTER
1/2/2019      RE: Jonatan Foy  [address] [place] [address] N  [facility] [address]       Dear Colleague,    Thank you for the opportunity to participate in the care of your patient, Jonatan Foy, at the [facility] at [facility]. Please see a copy of my visit note below.    HPI: Jonatan Foy is a 38 year old male with congenital solitary kidney s/p living donor kidney transplant in June 2017 failed secondary to rejection, back on dialysis since 2013 who presents today for cardiology evaluation for kidney transplant waitlist.    He has undergone a prior coronary angiogram in 2016 which showed no significant CAD. There has been note of pulmonary hypertension. In December 2016 on RHC it was reported as a mean of 38mmHg, felt to be secondary to fluid overload. His dry weight was challenged and repeat in July 2017 was mean 35mmHg. Of note, his cardiac output on this RHC was 12-13.    He dialyzes on T-Th-Sat with [employer], runs for 3:45- no issues with hypotension. He denies any chest pain, palpitations, dyspnea. Dry weight now 83kg, has been dropping.Mild leg edema- worse with standing for prolonged periods. Mild abdominal bloating unchanged. No PND, orthopnea. Sleeps in bed- 2 pillows, not secondary to orthopnea. No history of sleep apnea. He considers himself very active. Says he can do >2 flights stairs and more than several blocks ambulation without issue. Does not take aspirin.     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 Lives in [place] with his ex-wife, her  and child. No smoking history. No ETOH.     Family History:  No family  "history of premature CAD. Mom  3 years ago- prior smoker, recurrent pneumonia, lung collapsed  Dad with Hypertension    Medications:  Current Outpatient Medications   Medication Sig     lisinopril (PRINIVIL,ZESTRIL) 40 MG tablet Take 40 mg by mouth     METOPROLOL TARTRATE 100 MG PO TABS ONE TABLET TWICE DAILY     minoxidil (LONITEN) 2.5 MG tablet Take 2.5 mg by mouth 2 times daily     NIFEdipine (ADALAT CC) 90 MG TB24 Take 90 mg by mouth daily     No current facility-administered medications for this visit.      Review of Systems:  No fever, chills, night sweats  No cough or dyspnea  Cardiac as above  Bloating as above, No melena or hematochezia  No urine output    Physical Exam:   /84 (BP Location: Right arm, Patient Position: Chair, Cuff Size: Adult Regular)   Pulse 66   Ht 1.702 m (5' 7\")   Wt 86.3 kg (190 lb 4.8 oz)   SpO2 100%   BMI 29.81 kg/m     Gen: seated in chair, in NAD  HEENT: Nc/AT, sclera anicteric, MMM  Pulm: CTA B, no wheezing or rales  CV: RRR, 3/6 murmur noted likely secondary to large fistula  Abd: ND, +BS, soft  Ext: 1+ edema. L forearm large fistula noted  Neuro: alert and conversant, moving all extremities  Psych: pleasant mood and affect    Data:  EKG today: NSR, rate 60s, normal R wave progression, no ST-T changes.     Echocardiogram 2018: Global LV function normal, EF 55-60%. Moderate MR- etiology unclear but possibly with restricted posterior leaflet. Global RV function normal. Severe LAE, moderate JERRY. Moderate TR, moderate pulmonary hypertension. RVSP is 47.9+RAP.  Dilated IVC with abnormal respiratory variation. Estimate RAP of 15mmHg.     RHC 2017: mean RA 11mmHg, mean PA 35mmHg, wedge 20mmHg  RHC 2016: mean RA 16mmHg, mean PA 38mmHg, wedge 23mmHg.     CORONARY ANGIOGRAM 2016:    Both coronary arteries arise from their respective cusps. Right dominant. Left main is large without any angiographic evidence of disease. LAD is a large type 3 vessel giving " rise to septal perforators, a large D1 and small D2 and D3.  There is no angiographic evidence of disease in any segment. LCX is a large vessel giving rise to small OM1 and large OM2 and OM3 vessels.  The pLCx has a moderate focal calcification with diffuse minimal luminal irregularities.  The mLCx, dLCx, OM2 and OM3 have no angiographic evidence of disease. RCA is a large vessel giving rise to large RPDA and RPL branches.  There is no angiographic evidence of disease.      Assessment and Plan: This is a 38 year old male with solitary kidney, prior kidney transplant s/p rejection back on hemodialysis for 5 years, with prior pulmonary hypertension noted- felt to be secondary to volume overload who presents today for annual cardiac evaluation for kidney transplant waitlist. He is stable from a cardiovascular disease standpoint with no evidence of unstable angina, decompensated heart failure or significant arrhythmias. He can achieve 4 METS without difficulty and has a prior coronary angiogram from 2 years ago without significant disease. Given his risk is low and he's had recent (<3 years) evaluation of his coronary arteries I do not think he needs additional testing at this time with stress testing.    As for the pulmonary hypertension, his echo again shows volume overload, it was done according to him the morning of a non dialysis day. Would recommend to challenge his dry weight. He had evaluation last year with right heart cath which showed PA pressures elevated secondary to elevated left side pressures. I don't see use in repeating this year though if he continues to be volume overloaded on the left he can with time develop pre capillary pulmonary hypertension as well so again I would try to get more aggressive with volume removal.     Other medications were reviewed- recommend continuation of beta blocker perioperatively.     I believe the patient may proceed with transplant at an acceptable perioperative risk. I  have notified his team, please don't hesitate to ask if you have questions or concerns.     Recommend ongoing evaluation and clinical follow-up annually if still on the transplant list.    Bety Wong PA-C

## 2019-01-02 NOTE — PATIENT INSTRUCTIONS
It was a pleasure to see you in the cardiology clinic today.    If you have any questions, you can reach our nurses at [phone].  Press Option #1 for the [facility], and then press Option #3 for nursing.     1. No change in your treatment, ok to proceed with kidney transplant    Control your risk of coronary artery disease with these lifestyle changes:  - Eating a heart healthy diet by following the American Heart Association Recommendations: Reduce saturated fat and trans fat to 5-6 percent of daily calories and minimizing the amount of trans fat you eat by limiting your intake of red meat and dairy products made with whole milk. It also means choosing skim milk, low-fat or fat-free dairy products, limiting fried food, and cooking with healthy oils such as vegetable oil. A healthy diet should include emphasis on fruits, vegetables, whole grains, poultry, fish and nuts, and limiting sugary foods and beverages. We recommend following the DASH (Dietary Approaches to Stop Hypertension) or Mediterranean Diet.  - Regular Exercise: Just 40 minutes of aerobic exercise of moderate to vigorous intensity done 3-4 times per week is enough to lower both cholesterol and high blood pressure. Brisk walking, swimming, bicycling or a dance class are examples.  - Avoiding Tobacco Smoking: Smoking compounds the risk from other risk factors for heart disease including high cholesterol, high blood pressure, and diabetes. Smokers can lower cholesterol, blood pressure, and protect their arteries by quitting. Ask to learn more about quitting smoking.  - Losing Weight: Being overweight or obese raises your risk of high cholesterol, high blood pressure, and diabetes which are all risk factors for heart disease. Losing excess weight can improve cholesterol levels, blood pressure, and reduce incidence of diabetes and potentially reverse these disease processes.     Bety Wong PA-C  [facility]  [facility]

## 2019-01-03 LAB — INTERPRETATION ECG - MUSE: NORMAL

## 2019-03-05 DIAGNOSIS — Z76.82 AWAITING ORGAN TRANSPLANT: ICD-10-CM

## 2019-04-08 ENCOUNTER — APPOINTMENT (OUTPATIENT)
Dept: LAB | Facility: CLINIC | Age: 39
End: 2019-04-08
Attending: TRANSPLANT SURGERY
Payer: MEDICARE

## 2019-06-06 DIAGNOSIS — Z76.82 ORGAN TRANSPLANT CANDIDATE: ICD-10-CM

## 2019-06-06 DIAGNOSIS — N18.6 ESRD (END STAGE RENAL DISEASE) (H): ICD-10-CM

## 2019-06-10 DIAGNOSIS — N18.6 ESRD (END STAGE RENAL DISEASE) (H): ICD-10-CM

## 2019-06-10 DIAGNOSIS — Z76.82 ORGAN TRANSPLANT CANDIDATE: ICD-10-CM

## 2019-06-10 NOTE — LETTER
PHYSICIAN ORDER   ALA/PRA BLOOD    DATE & TIME ISSUED: 2017 9:58 AM  PATIENT NAME: Jonatan Foy   : 1980     [facility] MR#  [id number]     DIAGNOSIS/ICD-10 CODE: Awaiting Organ transplant [Z76.82}   EXPIRES: (1 YEAR AFTER DATE ISSUED)   EVERY 12 weeks / 3 months  Please take to dialysis and have them send in these labs as soon as your receive the mailers.  1. Please draw 20ml of blood in red top (plain) tube for Antileukocyte Antibody (ALA or PRA).   2. Label tubes with the patient s name, complete lab slip.         3. Mailers, lab slips with instructions are sent to patient separately.      4. Call the Outreach Lab at [phone] to reorder mailers.       5. Mail blood to (this address is also on the mailers):    IMMUNOLOGY LABORATORY  [facility]  Room [phone]  [address]  [address]            Tremayne Strong MD  Department of Surgery     English

## 2019-09-03 ENCOUNTER — APPOINTMENT (OUTPATIENT)
Dept: LAB | Facility: CLINIC | Age: 39
End: 2019-09-03
Attending: TRANSPLANT SURGERY
Payer: MEDICARE

## 2019-09-03 ENCOUNTER — TRANSFERRED RECORDS (OUTPATIENT)
Dept: HEALTH INFORMATION MANAGEMENT | Facility: CLINIC | Age: 39
End: 2019-09-03

## 2019-09-04 DIAGNOSIS — Z76.82 AWAITING ORGAN TRANSPLANT: ICD-10-CM

## 2019-09-04 DIAGNOSIS — N18.6 ESRD (END STAGE RENAL DISEASE) (H): Primary | ICD-10-CM

## 2019-09-04 DIAGNOSIS — N18.6 ESRD (END STAGE RENAL DISEASE) (H): ICD-10-CM

## 2019-09-10 ENCOUNTER — TELEPHONE (OUTPATIENT)
Dept: TRANSPLANT | Facility: CLINIC | Age: 39
End: 2019-09-10

## 2019-09-10 DIAGNOSIS — N18.6 ESRD (END STAGE RENAL DISEASE) (H): ICD-10-CM

## 2019-09-10 DIAGNOSIS — Z76.82 ORGAN TRANSPLANT CANDIDATE: ICD-10-CM

## 2019-09-11 ENCOUNTER — DOCUMENTATION ONLY (OUTPATIENT)
Dept: TRANSPLANT | Facility: CLINIC | Age: 39
End: 2019-09-11

## 2019-09-11 NOTE — TELEPHONE ENCOUNTER
Spoke with the patient and confirmed wait list appointments on 10/22.  Informed patient an itinerary can be accessed on [employer], and will be sent via mail.

## 2019-10-03 ENCOUNTER — HEALTH MAINTENANCE LETTER (OUTPATIENT)
Age: 39
End: 2019-10-03

## 2019-10-21 ENCOUNTER — TELEPHONE (OUTPATIENT)
Dept: TRANSPLANT | Facility: CLINIC | Age: 39
End: 2019-10-21

## 2019-10-21 NOTE — TELEPHONE ENCOUNTER
Patient Call: General  Route to LPN    Reason for call: pt needs to reschedule both appt he has for 10/22 for the next available day.    Call back needed? Yes    Return Call Needed  Same as documented in contacts section  When to return call?: Greater than one day: Route standard priority

## 2019-10-22 ENCOUNTER — DOCUMENTATION ONLY (OUTPATIENT)
Dept: TRANSPLANT | Facility: CLINIC | Age: 39
End: 2019-10-22

## 2019-10-23 ENCOUNTER — COMMITTEE REVIEW (OUTPATIENT)
Dept: TRANSPLANT | Facility: CLINIC | Age: 39
End: 2019-10-23

## 2019-10-23 NOTE — COMMITTEE REVIEW
Abdominal Committee Review Note     Evaluation Date: 6/24/2015  Committee Review Date: 10/23/2019    Organ being evaluated for: Kidney    Transplant Phase: Waitlist  Transplant Status: Inactive    Transplant Coordinator: Kala Evans  Transplant Surgeon:   Montana Moyer    Referring Physician: Vicky Sherman    Primary Diagnosis: Other, Specify - congenital solitary kidney  Secondary Diagnosis:     Committee Review Members:  Nephrology Jose Peterson, APRN CNP   Nurse Nighat Farmer, RN, Alexia Schmitz, RN   Nutrition Ida Azevedo, STALIN   Pharmacist Serafin Avila, [facility]   Pharmacy Serafin Avila, [facility]    - Clinical Lyly Kiran, [facility], Lyudmilapaloma John, [facility]   Transplant Zakia Sal PA-C, Becca Elmore, DAMIEN, Rossana Hernandez, TIERA, Tamiko Fry, DAMIEN, Zeina Santos, DAMIEN, Christ Purcell MD, Sapna Sanz RN, Avis Gonzalez MD, MD       Transplant Eligibility: Irreversible chronic kidney disease treated w/dialysis or expected need for dialysis    Committee Review Decision: Remain Inactive    Relative Contraindications: None    Absolute Contraindications: None    Committee Chair Avis Gonzalez MD verbally attested to the committee's decision.    Committee Discussion Details: Approved inactive due to being too sick for transplant at this time.

## 2019-10-24 ENCOUNTER — MEDICAL CORRESPONDENCE (OUTPATIENT)
Dept: TRANSPLANT | Facility: CLINIC | Age: 39
End: 2019-10-24

## 2019-12-05 ENCOUNTER — TELEPHONE (OUTPATIENT)
Dept: TRANSPLANT | Facility: CLINIC | Age: 39
End: 2019-12-05

## 2019-12-09 ENCOUNTER — TELEPHONE (OUTPATIENT)
Dept: TRANSPLANT | Facility: CLINIC | Age: 39
End: 2019-12-09

## 2020-01-02 ENCOUNTER — TELEPHONE (OUTPATIENT)
Dept: NEPHROLOGY | Facility: CLINIC | Age: 40
End: 2020-01-02

## 2020-01-02 NOTE — TELEPHONE ENCOUNTER
Left message for pt reminding them of upcoming appointment.  Instructed pt to bring updated medications list.Reggie Carcamo/PRUDENCE  January 2, 2020 9:32 AM

## 2020-01-14 ENCOUNTER — TELEPHONE (OUTPATIENT)
Dept: TRANSPLANT | Facility: CLINIC | Age: 40
End: 2020-01-14

## 2020-02-08 ENCOUNTER — HEALTH MAINTENANCE LETTER (OUTPATIENT)
Age: 40
End: 2020-02-08

## 2020-02-12 NOTE — TELEPHONE ENCOUNTER
Spoke with the patient and confirmed Evals: Waitlist appointments on March 10 .  Informed patient an itinerary can be accessed via [employer].

## 2020-03-09 ENCOUNTER — TELEPHONE (OUTPATIENT)
Dept: NEPHROLOGY | Facility: CLINIC | Age: 40
End: 2020-03-09

## 2020-03-09 NOTE — TELEPHONE ENCOUNTER
Left message for pt reminding them of upcoming appointment.  Instructed pt to bring updated medications list.Reggie Carcamo/PRUDENCE  March 9, 2020 4:13 PM

## 2020-03-10 ENCOUNTER — ALLIED HEALTH/NURSE VISIT (OUTPATIENT)
Dept: TRANSPLANT | Facility: CLINIC | Age: 40
End: 2020-03-10
Attending: INTERNAL MEDICINE
Payer: COMMERCIAL

## 2020-03-10 ENCOUNTER — OFFICE VISIT (OUTPATIENT)
Dept: NEPHROLOGY | Facility: CLINIC | Age: 40
End: 2020-03-10
Attending: INTERNAL MEDICINE
Payer: COMMERCIAL

## 2020-03-10 ENCOUNTER — TRANSFERRED RECORDS (OUTPATIENT)
Dept: HEALTH INFORMATION MANAGEMENT | Facility: CLINIC | Age: 40
End: 2020-03-10

## 2020-03-10 VITALS
OXYGEN SATURATION: 99 % | DIASTOLIC BLOOD PRESSURE: 76 MMHG | SYSTOLIC BLOOD PRESSURE: 136 MMHG | TEMPERATURE: 98.1 F | BODY MASS INDEX: 26.21 KG/M2 | HEART RATE: 67 BPM | WEIGHT: 167 LBS | HEIGHT: 67 IN

## 2020-03-10 DIAGNOSIS — N18.6 ESRD (END STAGE RENAL DISEASE) ON DIALYSIS (H): Primary | ICD-10-CM

## 2020-03-10 DIAGNOSIS — Z76.82 AWAITING ORGAN TRANSPLANT: Primary | ICD-10-CM

## 2020-03-10 DIAGNOSIS — Z99.2 ESRD (END STAGE RENAL DISEASE) ON DIALYSIS (H): Primary | ICD-10-CM

## 2020-03-10 ASSESSMENT — PAIN SCALES - GENERAL: PAINLEVEL: NO PAIN (0)

## 2020-03-10 ASSESSMENT — PATIENT HEALTH QUESTIONNAIRE - PHQ9: SUM OF ALL RESPONSES TO PHQ QUESTIONS 1-9: 3

## 2020-03-10 ASSESSMENT — MIFFLIN-ST. JEOR: SCORE: 1630.64

## 2020-03-10 NOTE — PROGRESS NOTES
Assessment and Plan:  #Kidney Transplant Wait List Evaluation: Patient is a fair candidate overall. Patient should remain inactive on the wait list.    # ESKD from congenital solitary kidney: s/p failed LDKT from 2007 2/2 non-compliance and on dialysis since 2013. When ready, he may benefit from a kidney transplant.    # Ascites: present for past approximately 9 months. Was requiring paracenteses every few weeks, but last done in January 2020, although he does feel as though he is starting to accumulate again. Will get records from [facility] where he has been following.    # Pulmonary HTN: RVSP 100 mmHg on August 2019 ECHO during admit for worsening ascites. Depending on results of GI work up, he may require further evaluation in pulmonary HTN clinic again.    # Cardiac Risk: ECHO done during August 2019 admit for worsening ascites also showed moderate mitral and tricuspid regurgitation, as well as mild aortic stenosis with mean gradient of 14 mmHg with moderate insufficiency. Degree of MR and TR noted to be unchanged when compared to 2016 ECHO. When ready, he will need repeat risk assessment.     # Compliance: previously completed compliance contract. Appreciate social work input.    # Pseudotumor cerebri s/p  shunt 1998    # Health Maintenance: Dental: Up to date     Discussed the risks and benefits of a transplant, including the risk of surgery and immunosuppression medications.    Patient s overall re-evaluation may require further discussion in the Transplant Program s multidisciplinary selection committee for a final recommendation on the patient s suitability for transplant.     Reason for Visit:  Jonatan Foy is a 39-year-old male with ESKD from congenital solitary kidney, who presents for kidney transplant wait list evaluation.     Date of Initial Transplant Evaluation:  2013        Current Transplant Phase: Waitlist: Inactive  Official UNOS Listing Date: 6/13/2013  Blood Type: A        cPRA: 100       Date  of cPRA: September 2019  Transplant Coordinator: Kala Evans Transplant Office phone number [phone]     History of Present Illness:   Mr. Foy is a 39-year-old male with history of ESKD from congenital solitary kidney s/p LDKT 2007 failed 2/2 non-compliance and on dialysis since 2013, HTN, pseudotumor cerebri s/p  shunt, and history of pulmonary HTN who presents for wait list follow up.         Interim Events:        - Pulmonary HTN: previously evaluated here and thought likely 2/2 volume overload based on right heart catheterization findings in 2016 and 2017 (mean PA pressure 38 mmHg and 35 mmHg, respectively). He later developed ascites requiring paracenteses mid-2019 and was admitted locally in August 2019 with worsening ascites requiring paracenteses and severe pulmonary HTN (RVSP 100 mmHg on ECHO, dilated RV). Work up during that admission was thought to be negative for portal hypertension, infection, or malignancy. Liver noted to be normal on CT imaging. He's been following with [facility] for past several months. Records not currently available. Unfortunately, he continued to require paracenteses every 2 weeks, but hasn't needed one now since January 2020. Last HD notes report high intradialytic weight gains, but he feels this has been improving.          Kidney Disease: Dialysis is going well. He is anuric.       Kidney Disease Dx: congenital solitary kidney        On Dialysis: Yes, Date initiated: 2013 and Dialysis Type: [facility];       Primary Nephrologist: Dr. Nobles         Cardiac/Vascular Disease History:       Known CAD: No       Known PAD/Claudication Symptoms: No       Known Heart Failure: No       Arrhythmia:  No       Pulmonary Hypertension: Yes; as above        Valvular Disease: Yes; as above       Other: None         Functional Capacity/Frailty:        He doesn't do anything in particular for exercise, but is able to walk a few blocks. He denies chest pain, SOB, or claudication symptoms  with exertion.     Fatigue/Decreased Energy: [x] No [] Yes    Chest Pain or SOB with Exertion: [x] No [] Yes    Significant Weight Change: [x] No [] Yes    Nausea, Vomiting or Diarrhea: [x] No [] Yes    Fever, Sweats or Chills:  [x] No [] Yes    Leg Swelling [x] No [] Yes        Other Pertinent Transplant Surgical Issues:  Recent Blood Transfusion: No  Previous Abdominal Transplant: Yes  Bladder Dysfunction: anuric  Chronic/Recurrent Infections: No  Chronic Anticoagulation: No  Jehovah s Witness: No    Review Of Systems:  A comprehensive review of systems was obtained and negative, except as noted in the HPI or PMH.     Active Problem List:   Patient Active Problem List   Diagnosis     Kidney replaced by transplant     Benign intracranial hypertension     CARDIOVASCULAR SCREENING; LDL GOAL LESS THAN 100     Anemia in chronic renal disease     Status post coronary angiogram     HTN (hypertension)     ESRD (end stage renal disease) on dialysis (H)     Congenital solitary kidney     Pulmonary hypertension (H)     S/P  shunt       Personal History:  Social History     Socioeconomic History     Marital status:      Spouse name: Not on file     Number of children: Not on file     Years of education: Not on file     Highest education level: Not on file   Occupational History     Not on file   Social Needs     Financial resource strain: Not on file     Food insecurity     Worry: Not on file     Inability: Not on file     Transportation needs     Medical: Not on file     Non-medical: Not on file   Tobacco Use     Smoking status: Never Smoker     Smokeless tobacco: Never Used   Substance and Sexual Activity     Alcohol use: No     Drug use: No     Sexual activity: Not on file   Lifestyle     Physical activity     Days per week: Not on file     Minutes per session: Not on file     Stress: Not on file   Relationships     Social connections     Talks on phone: Not on file     Gets together: Not on file     Attends  "Mu-ism service: Not on file     Active member of club or organization: Not on file     Attends meetings of clubs or organizations: Not on file     Relationship status: Not on file     Intimate partner violence     Fear of current or ex partner: Not on file     Emotionally abused: Not on file     Physically abused: Not on file     Forced sexual activity: Not on file   Other Topics Concern     Parent/sibling w/ CABG, MI or angioplasty before 65F 55M? No   Social History Narrative     Not on file        Allergies:  Allergies   Allergen Reactions     Tramadol Other (See Comments) and Anaphylaxis     Due to kidney transplant     Codeine Hives     Ibu [Nsaids] Other (See Comments)     Due to kidney transplant     Morphine GI Disturbance        Medications:  Current Outpatient Medications   Medication Sig     lisinopril (PRINIVIL,ZESTRIL) 40 MG tablet Take 40 mg by mouth     METOPROLOL TARTRATE 100 MG PO TABS ONE TABLET TWICE DAILY     minoxidil (LONITEN) 2.5 MG tablet Take 2.5 mg by mouth 2 times daily     NIFEdipine (ADALAT CC) 90 MG TB24 Take 90 mg by mouth daily     No current facility-administered medications for this visit.         Vitals:  /76 (BP Location: Right arm, Patient Position: Sitting, Cuff Size: Adult Regular)   Pulse 67   Temp 98.1  F (36.7  C) (Oral)   Ht 1.693 m (5' 6.65\")   Wt 75.8 kg (167 lb)   SpO2 99%   BMI 26.43 kg/m       Exam:  GENERAL APPEARANCE: alert and no distress  HENT: mouth without ulcers or lesions. Fair dentition   LYMPHATICS: no cervical or supraclavicular nodes  RESP: lungs clear to auscultation - no rales, rhonchi or wheezes  CV: regular rhythm, normal rate, no rub, no murmur  EDEMA: no LE edema bilaterally  ABDOMEN: soft, mildly distended with small fluid wave, non-TTP  MS: extremities normal - no gross deformities noted, no evidence of inflammation in joints, no muscle tenderness  SKIN: no rash      "

## 2020-03-10 NOTE — PROGRESS NOTES
Patient Name: Jonatan Foy  : 1980  Age: 39 year old  MRN: [id number]  Date of Initial Social Work Evaluation: 2015. Most recent transplant visit 2018.    Patient on kidney transplant wait list.  Saw today to update psychosocial assessment.      Presenting Information   Living Situation: Lives in a house with his daughter Shweta (15), his ex-wife Renetta, her  Renato and their two kids in [place], [place].   If not local, plans for short term stay:  NA   Previous Functional Status: Independent. Pt goes to dialysis at [facility] //. Pt does not have a license and uses medical transportation to get to/from dialysis. Family transports otherwise.   Cultural/Language/Spiritual Considerations: English-speaking     Support System  Primary Support Person Pt's ex-wife (Renetta) and her  (Renato). Renetta works during the night, Renato during the day, so someone will be around at all times.   Other support:  Pt has a 14-year-old daughter that lives with them. Pt's father and sister live in [place], [place] and are able to help at times.   Plan for support in immediate post-transplant period: Ex-wife and her .     Health Care Directive  Decision Maker: Patient   Alternate Decision Maker: Pt's father   Health Care Directive: Provided education    Mental Health/Coping:   History of Mental Health: Denied  History of Chemical Health: Denied  Current status: Pt denied any current mental health or chemical health concerns.   Coping: Pt appeared to be coping well   Services Needed/Recommended: None identified at this time     Financial   Income: Pt has been on SSDI since  due to ESRD. 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   Impact of transplant on income: Discussed possible loss of eligibility for SSDI post-transplant.   Insurance and medication coverage: Medicare and [employer] [place]. Pt also reported having an AARP plan.   Financial concerns: None identified.   Resources  needed: None identified.     Assessment and recommendations and plan: Pt has been on dialysis since 2013. Discussed compliance due to previous compliance contract. Pt reported that he continues to be compliant, attends dialysis regularly and takes his medications regularly. He did acknowledge that he missed a few appointments recently due to his grandmother's death.  Reviewed transplant education (Medicare, rehabilitation, donor issues, community/financial resources, and psych/family adjustment) as well as psychosocial risks of transplant. Provided patient with a copy of post-transplant informational sheet that includes information on potential costs of medications, Medicare ESRD, post-transplant lodging, etc. Patient seemed to process information well. Appeared well informed, motivated, and able to follow post transplant requirements. Behavior was appropriate during interview. Has adequate income and insurance coverage. Adequate social support. No major contraindications noted for transplant. At this time, patient appears to understand the risks and benefits of transplant.     Lyly Kiran [facility]    Kidney/Pancreas/Auto Islet Transplant Programs

## 2020-03-10 NOTE — NURSING NOTE
"Chief Complaint   Patient presents with     RECHECK     Kidney WTL         /76 (BP Location: Right arm, Patient Position: Sitting, Cuff Size: Adult Regular)   Pulse 67   Temp 98.1  F (36.7  C) (Oral)   Ht 1.693 m (5' 6.65\")   Wt 75.8 kg (167 lb)   SpO2 99%   BMI 26.43 kg/m          Elvira Barajas CMA    3/10/2020 3:28 PM      "

## 2020-03-10 NOTE — LETTER
3/10/2020       RE: Jonatan Foy  [address] [place] [id number]     Dear Colleague,    Thank you for referring your patient, Jonatan Foy, to the [facility] at [facility]. Please see a copy of my visit note below.    Assessment and Plan:  #Kidney Transplant Wait List Evaluation: Patient is a fair candidate overall. Patient should remain inactive on the wait list.    # ESKD from congenital solitary kidney: s/p failed LDKT from 2007 2/2 non-compliance and on dialysis since 2013. When ready, he may benefit from a kidney transplant.    # Ascites: present for past approximately 9 months. Was requiring paracenteses every few weeks, but last done in January 2020, although he does feel as though he is starting to accumulate again. Will get records from [facility] where he has been following.    # Pulmonary HTN: RVSP 100 mmHg on August 2019 ECHO during admit for worsening ascites. Depending on results of GI work up, he may require further evaluation in pulmonary HTN clinic again.    # Cardiac Risk: ECHO done during August 2019 admit for worsening ascites also showed moderate mitral and tricuspid regurgitation, as well as mild aortic stenosis with mean gradient of 14 mmHg with moderate insufficiency. Degree of MR and TR noted to be unchanged when compared to 2016 ECHO. When ready, he will need repeat risk assessment.     # Compliance: previously completed compliance contract. Appreciate social work input.    # Pseudotumor cerebri s/p  shunt 1998    # Health Maintenance: Dental: Up to date     Discussed the risks and benefits of a transplant, including the risk of surgery and immunosuppression medications.    Patient s overall re-evaluation may require further discussion in the Transplant Program s multidisciplinary selection committee for a final recommendation on the patient s suitability for transplant.     Reason for Visit:  Jonatan Foy is a 39-year-old male with  ESKD from congenital solitary kidney, who presents for kidney transplant wait list evaluation.     Date of Initial Transplant Evaluation:  2013        Current Transplant Phase: Waitlist: Inactive  Official UNOS Listing Date: 6/13/2013  Blood Type: A        cPRA: 100       Date of cPRA: September 2019  Transplant Coordinator: Kala Evans Transplant Office phone number [phone]     History of Present Illness:   Mr. Foy is a 39-year-old male with history of ESKD from congenital solitary kidney s/p LDKT 2007 failed 2/2 non-compliance and on dialysis since 2013, HTN, pseudotumor cerebri s/p  shunt, and history of pulmonary HTN who presents for wait list follow up.         Interim Events:        - Pulmonary HTN: previously evaluated here and thought likely 2/2 volume overload based on right heart catheterization findings in 2016 and 2017 (mean PA pressure 38 mmHg and 35 mmHg, respectively). He later developed ascites requiring paracenteses mid-2019 and was admitted locally in August 2019 with worsening ascites requiring paracenteses and severe pulmonary HTN (RVSP 100 mmHg on ECHO, dilated RV). Work up during that admission was thought to be negative for portal hypertension, infection, or malignancy. Liver noted to be normal on CT imaging. He's been following with [facility] for past several months. Records not currently available. Unfortunately, he continued to require paracenteses every 2 weeks, but hasn't needed one now since January 2020. Last HD notes report high intradialytic weight gains, but he feels this has been improving.          Kidney Disease: Dialysis is going well. He is anuric.       Kidney Disease Dx: congenital solitary kidney        On Dialysis: Yes, Date initiated: 2013 and [facility];       Primary Nephrologist: Dr. Nobles         Cardiac/Vascular Disease History:       Known CAD: No       Known PAD/Claudication Symptoms: No       Known Heart Failure: No       Arrhythmia:  No        Pulmonary Hypertension: Yes; as above        Valvular Disease: Yes; as above       Other: None         Functional Capacity/Frailty:        He doesn't do anything in particular for exercise, but is able to walk a few blocks. He denies chest pain, SOB, or claudication symptoms with exertion.     Fatigue/Decreased Energy: [x] No [] Yes    Chest Pain or SOB with Exertion: [x] No [] Yes    Significant Weight Change: [x] No [] Yes    Nausea, Vomiting or Diarrhea: [x] No [] Yes    Fever, Sweats or Chills:  [x] No [] Yes    Leg Swelling [x] No [] Yes        Other Pertinent Transplant Surgical Issues:  Recent Blood Transfusion: No  Previous Abdominal Transplant: Yes  Bladder Dysfunction: anuric  Chronic/Recurrent Infections: No  Chronic Anticoagulation: No  Jehovah s Witness: No    Review Of Systems:  A comprehensive review of systems was obtained and negative, except as noted in the HPI or PMH.     Active Problem List:   Patient Active Problem List   Diagnosis     Kidney replaced by transplant     Benign intracranial hypertension     CARDIOVASCULAR SCREENING; LDL GOAL LESS THAN 100     Anemia in chronic renal disease     Status post coronary angiogram     HTN (hypertension)     ESRD (end stage renal disease) on dialysis (H)     Congenital solitary kidney     Pulmonary hypertension (H)     S/P  shunt       Personal History:  Social History     Socioeconomic History     Marital status:      Spouse name: Not on file     Number of children: Not on file     Years of education: Not on file     Highest education level: Not on file   Occupational History     Not on file   Social Needs     Financial resource strain: Not on file     Food insecurity     Worry: Not on file     Inability: Not on file     Transportation needs     Medical: Not on file     Non-medical: Not on file   Tobacco Use     Smoking status: Never Smoker     Smokeless tobacco: Never Used   Substance and Sexual Activity     Alcohol use: No     Drug use: No  "    Sexual activity: Not on file   Lifestyle     Physical activity     Days per week: Not on file     Minutes per session: Not on file     Stress: Not on file   Relationships     Social connections     Talks on phone: Not on file     Gets together: Not on file     Attends [employer] service: Not on file     Active member of club or organization: Not on file     Attends meetings of clubs or organizations: Not on file     Relationship status: Not on file     Intimate partner violence     Fear of current or ex partner: Not on file     Emotionally abused: Not on file     Physically abused: Not on file     Forced sexual activity: Not on file   Other Topics Concern     Parent/sibling w/ CABG, MI or angioplasty before 65F 55M? No   Social History Narrative     Not on file        Allergies:  Allergies   Allergen Reactions     Tramadol Other (See Comments) and Anaphylaxis     Due to kidney transplant     Codeine Hives     Ibu [Nsaids] Other (See Comments)     Due to kidney transplant     Morphine GI Disturbance        Medications:  Current Outpatient Medications   Medication Sig     lisinopril (PRINIVIL,ZESTRIL) 40 MG tablet Take 40 mg by mouth     METOPROLOL TARTRATE 100 MG PO TABS ONE TABLET TWICE DAILY     minoxidil (LONITEN) 2.5 MG tablet Take 2.5 mg by mouth 2 times daily     NIFEdipine (ADALAT CC) 90 MG TB24 Take 90 mg by mouth daily     No current facility-administered medications for this visit.         Vitals:  /76 (BP Location: Right arm, Patient Position: Sitting, Cuff Size: Adult Regular)   Pulse 67   Temp 98.1  F (36.7  C) (Oral)   Ht 1.693 m (5' 6.65\")   Wt 75.8 kg (167 lb)   SpO2 99%   BMI 26.43 kg/m       Exam:  GENERAL APPEARANCE: alert and no distress  HENT: mouth without ulcers or lesions. Fair dentition   LYMPHATICS: no cervical or supraclavicular nodes  RESP: lungs clear to auscultation - no rales, rhonchi or wheezes  CV: regular rhythm, normal rate, no rub, no murmur  EDEMA: no LE edema " bilaterally  ABDOMEN: soft, mildly distended with small fluid wave, non-TTP  MS: extremities normal - no gross deformities noted, no evidence of inflammation in joints, no muscle tenderness  SKIN: no rash        Again, thank you for allowing me to participate in the care of your patient.      Sincerely,    Zakia Sal PA-C

## 2020-03-11 ENCOUNTER — DOCUMENTATION ONLY (OUTPATIENT)
Dept: TRANSPLANT | Facility: CLINIC | Age: 40
End: 2020-03-11

## 2020-03-12 DIAGNOSIS — Z76.82 AWAITING ORGAN TRANSPLANT: ICD-10-CM

## 2020-03-12 DIAGNOSIS — N18.6 ESRD (END STAGE RENAL DISEASE) (H): ICD-10-CM

## 2020-03-18 ENCOUNTER — TELEPHONE (OUTPATIENT)
Dept: TRANSPLANT | Facility: CLINIC | Age: 40
End: 2020-03-18

## 2020-03-18 NOTE — TELEPHONE ENCOUNTER
Spoke with patient who reports he recently went to the [facility] in [place]; writer to obtain records.

## 2020-04-01 DIAGNOSIS — I27.20 PULMONARY HYPERTENSION (H): Primary | ICD-10-CM

## 2020-04-06 ENCOUNTER — TELEPHONE (OUTPATIENT)
Dept: TRANSPLANT | Facility: CLINIC | Age: 40
End: 2020-04-06

## 2020-04-06 NOTE — TELEPHONE ENCOUNTER
Coordinator left pt msg. Our transplant nephrology team was able to review his outside [facility] records (scanned in on 4/2/20). We would like pt to be seen by our pulmonary hypertension clinic. Coordinator has sent staff msg to pulmonary hypertension nurses. Unsure if pt would be able to do a virtual or telephone visit at this time. Am hoping pulmonary hypertension team will reach out to pt with a plan. Contact information provided to pt for questions.

## 2020-04-08 NOTE — PROGRESS NOTES
Serafin Nobles,     [address]    [place] [address] [phone] (Fax    I conducted a virtual visit by telephonet with this patient and with his permission that lasted for 25 minutes We discussed the patient's current condition, reviewed interim history since last visit, current functional status, diet, and possible cardiac symptoms including: chest pain, tightness, heaviness, pressure, PND, orthopnea, ankle edema, increasing abdominal girth, weight gain, palpitation, pre-syncope or syncope.      I spent 30 minutes reviewing chart,coordinating care with renal and preparing report    We reviewed and suggested:  1. Viral epidemic safety suggestions  2. Continued adherence to medical regimen, diet, and exercise program as previously described  3. Current scheduled visit deferred in light of current situation  4. Call immediately should your health status change, and we will make arrangements to see you immediately        Impression:  1. ESRD  2. Atrial fibrillation  3. Remote myocardial infarction  4. History of renal transplant  5. Pseudotumor cerebri treated with  shunt  6. Shingles  7. Pericardial tamponade/remote  8. Iron deficiency  9. Pulmonary hypertension  10 Ascites  11. Aortic stenosis/mild  12. Right heart failure with preserved ejection fraction  13. Nodular liver configuration suggests adverse effect of long term elevation right sided pressue  14. Hypertension    Plan/Discussion    The patient appears to have progressive pulmonary hypertension with current evidence of right ventricular pressure/ volume overload likely related in part to volume status.  The patient previously had no significant coronary stenosis.  This degree of pulmonary hypertension makes him a poor long term candidate for repeat renal transplantation.    Would suggest admit to renal transplant or medicine service (cardiology service not necessary) for hemodynamically guided daily  dialysis to re-establish dry weight.  In view of current situation could have catherization on admission and then immediately prior to clinical discharge to confirm patient is dry.  The patient has lost almost 30 pounds over last 4 months.  He may now be dry    However, it should be noted that his weight has declined by almost 25  pounds since January.     Biliary US when dry to look for cirrhosis or portal hypertension.  Likely will require hepatology consultation as well as liver biopsy. There is mention of nodular live.     Iron deficiency: requires reassessment, GI consult for possible loss of iron, parenteral replacement    Constitutional: + 25 poundweight loss,but no fever, chills, night sweats  HEENT: without visual changes, swallow difficulties  Pulmonary: without shortness of breath, cough, wheeze, hemoptysis  [place]  Cardiac: without chest pain, VELAZQUEZ, PND, orthopnea, edema, palpitation, pre-syncope, syncope,  GI: without diarrhea, constipation, jaundice, melena, GERD, hematemesis  :dialysis  Skin: rash, bruise, open lesions  Neuro: without TIA, focal neurologic complaints, seizure, trauma  Ortho: without pain, swelling, mobility impairment  Endocrine: diabetes, thyroid, heat/cold intolerance, polyuria, polyphagia, change bowel habits.  Sleep:no KEI, periodic breathing, fatigue      Surgeries: renal transplant, fistula  Allergies:tramadol, codeine, morphine NSAID  Family history non-contributory   Blood type: self report A+      I conducted a virtual visit with this patient with his permission by telephone.(35 minutes. We discussed the patient's current condition, reviewed interim history since last visit, current functional status, diet, and possible cardiac symptoms including: chest pain, tightness, heaviness, pressure, PND, orthopnea, ankle edema, increasing abdominal girth, weight gain, palpitation, pre-syncope or syncope. I coordinated care with nephrology by  sending note to nephrology transplant nurse    Outside records reviewed:        Current Outpatient Medications   Medication     lisinopril (PRINIVIL,ZESTRIL) 40 MG tablet     METOPROLOL TARTRATE 100 MG PO TABS     minoxidil (LONITEN) 2.5 MG tablet     NIFEdipine (ADALAT CC) 90 MG TB24     No current facility-administered medications for this visit.      Wt Readings from Last 24 Encounters:   03/10/20 75.8 kg (167 lb)   01/02/19 86.3 kg (190 lb 4.8 oz)   11/29/18 87.6 kg (193 lb 3.2 oz)   11/21/17 84.9 kg (187 lb 3.2 oz)   08/02/17 81.3 kg (179 lb 3.2 oz)   07/19/17 80.7 kg (178 lb)   06/30/17 81.7 kg (180 lb 3.2 oz)   11/16/16 86.2 kg (190 lb)   06/24/15 84.2 kg (185 lb 9.6 oz)   06/14/11 112.9 kg (249 lb)   10/13/10 115.6 kg (254 lb 14.4 oz)   10/04/10 115.2 kg (254 lb)   06/23/10 117.2 kg (258 lb 6.4 oz)   04/15/08 120.4 kg (265 lb 8 oz)   11/01/07 112.9 kg (249 lb)   10/12/07 116.1 kg (256 lb)       Results for GABRIEL FORBES (MRN [id number]) as of 4/8/2020 13:38   Ref. Range 10/15/2019 00:00   Sodium (External) Latest Ref Range: 136 - 145 mEq/L 135 (L)   Potassium (External) Latest Ref Range: 3.5 - 54 mEq/L 5.2   Chloride (External) Latest Ref Range: 96 - 108 mEq/L 102   CO2 (External) Latest Ref Range: 22 - 29 mEq/L 22   Urea Nitrogen (External) Latest Ref Range: 6 - 19 mg/dL 45 (H)   Creatinine (External) Latest Ref Range: 0.60 - 1.30 mg/dL 7.73 (H)   Calcium (External) Latest Ref Range: 8.4 - 10.2 mg/dL 8.9   Phosphorus (External) Latest Ref Range: 2.6 - 4.5 mg/dL 5.9 (H)   Albumin (External) Latest Ref Range: 3.5 - 5.2 g/dL 3.2 (L)   Protein Total (External) Latest Ref Range: 6.0 - 8.5 g/dL 6.3   BUN/Creatinine Ratio (External) Latest Ref Range: 10.0 - 20.0  5.8 (L)   Ferritin (External) Latest Ref Range: 22 - 322 ng/mL 1,123 (H)   Iron (External) Latest Ref Range: 45 - 160 mcg/dL 16 (L)   Iron Binding Cap (External) Latest Ref Range: 185 - 515 mcg/dL 185   Iron Saturation % (External) Latest Ref Range: 20  - 55 % 10 (L)   WBC Count (External) Latest Ref Range: 4.80 - 10.80 thou/mcL 4.45 (L)   Hemoglobin (External) Latest Ref Range: 14.0 - 18.0 g/dL 11.2 (L)   Hematocrit (External) Latest Ref Range: 42.0 - 52.0 % 36.4 (L)   Platelet Count (External) Latest Ref Range: 130 - 400 thou/mcL 227   RBC Count (External) Latest Ref Range: 4.70 - 6.10 mil/mcL 4.04 (L)   MCV (External) Latest Ref Range: 80 - 100 fL 90   MCH (External) Latest Ref Range: 27.0 - 31.0 pg 27.7   MCHC (External) Latest Ref Range: 30.0 - 36.0 % 30.6   RDW (External) Latest Ref Range: 11.5 - 14.5 % 18.5 (H)   Parathyroid Hormone Intact (External) Latest Ref Range: 16 - 80  1,503 (H)     Imaging    Interpretation Summary    * The left ventricular systolic function is normal, estimated LVEF 60-65%.    * There is moderate concentric left ventricular hypertrophy.    * Left ventricular wall motion is normal.    * Normal right ventricular systolic function; right ventricle is  quantitatively dilated.    * There is moderate mitral regurgitation.    * There is moderate tricuspid regurgitation.    * There is mild aortic stenosis with a mean gradient of 14 mm Hg; there is  moderate aortic insufficiency.    * Severe pulmonary hypertension, estimated right ventricular systolic  pressure is 100 mmHg.    * Compared to prior study on 2016, no significant change in left  ventricular systolic function with side by side comparison; the degree of MR,  TR are unchanged; estimated PA systolic pressures remains severely elevated  (prior PA pressures of ~93 mm Hg).      Patient Info  Name:     GABRIEL FORBES  Age:     39 years  :     1980  Gender:     Male  MRN:     [id number]  Accession #:     [id number]  Ht:     67 in  Wt:     171 lbs  BSA:     1.93 m2  Systolic BP:     129 mmHg  Diastolic BP:     70 mmHg  Heart Rhythm:     Normal sinus rhythm  Technical Quality:     Diagnostic quality  Exam Date/Time:     2019 1:20 PM  Site Location:     [facility] Echo NMR  Exam  Location:     [facility] Echo [facility]  Patient Status:     Inpatient  Admit Date:     8/24/2019  Exam Type:     ECHOCARDIOGRAM    Staff  Ordering Physician:     WILLIAN CASTAÑEDA  Sonographer:     Antwan CHANCE RDCS    Study Info  Indications      I50.9 - Heart Failure,  Unspecified  Procedure    Complete two-dimensional, color flow and Doppler transthoracic  echocardiogram is performed.    Account #:     [id number]    Reading Physician:     Steffanie Corral MD    Primary Care Provider:     Unknown / None      Findings  Left Ventricle    The left ventricular systolic function is normal, estimated LVEF 60-65%.    The left ventricle is normal size.    Left ventricular wall motion is normal.    There is moderate concentric left ventricular hypertrophy.  Right Ventricle    The right ventricle is quantitatively enlarged.    Normal right ventricular systolic function; right ventricle is  quantitatively dilated.  Diastolic Function/Strain    The left ventricular diastolic function is indeterminate.      Left Atrium    The left atrium is mildly enlarged by quantification.    Right Atrium    The right atrium is enlarged.      Aortic Valve    The aortic valve is trileaflet.    There is mild aortic stenosis with a mean gradient of 14 mm Hg; there is  moderate aortic insufficiency.    Pulmonary Valve    The pulmonic valve is structurally normal.    There is no Doppler evidence of pulmonic valve sclerosis or stenosis.    There is moderate pulmonic regurgitation.    Mitral Valve    The mitral valve leaflets are mildly calcified.    There is no significant mitral stenosis.    There is moderate mitral regurgitation.    Tricuspid Valve    There is no significant tricuspid stenosis.    There is moderate tricuspid regurgitation.      Hemodynamics    The inferior vena cava is dilated (> 2.1 cm), < 50% respiratory variance, RA  pressure elevated at 15 mmHg.    Severe pulmonary hypertension, estimated right ventricular systolic pressure  is 100  mmHg.    Septae    There is no evidence of ASD/PFO by color Doppler; however no bubble study  was performed.    Pericardium/Pleura    There is no pericardial effusion.    Vessels    The aortic measurements are indexed to age and body surface area.    The aortic root is normal in size measuring 3.4 cm.    The proximal ascending aorta is normal in size measuring 3.5 cm.      2D Measurements  ----------------------------------------------------------------------  Name                                 Value        Normal  ----------------------------------------------------------------------    Parasternal 2D  ----------------------------------------------------------------------  IVS Diastolic Thickness (2D)        1.5 cm          <1.0   LVID Diastole (2D)                  5.2 cm         <=5.5   LVIW Diastolic Thickness  (2D)                                1.4 cm       0.6-1.0   LVID Systole (2D)                   3.4 cm       2.5-4.0   LVOT Diameter                       2.0 cm                 LV Fractional Shortening  (2D)                                  34 %         25-43   LA Dimension (2D)                   5.3 cm       3.0-4.1   Ao Root Diameter (2D)               2.2 cm                 Sinus of Valsalva Diameter          3.4 cm       3.1-3.7   Prox Asc Ao Diameter                3.5 cm       2.6-3.4     LV Ejection Fraction 2D  ----------------------------------------------------------------------  LV EF (4C MOD)                        58 %                 LV EF (2C MOD)                        63 %                 LV EF (BP MOD)                        59 %         52-72   LV End Diastolic Volume (BP  A-L)                              160.8 ml                 LV End Systolic Volume (BP  A-L)                               64.3 ml                   Apical 2D Dimensions  ----------------------------------------------------------------------  RV Basal Diastolic Dimension        4.9 cm       2.5-4.1   RV Mid-Cavity  Diastolic  Dimension                           3.6 cm       1.9-3.5   LA Volume (4C MOD)                 78.0 ml                 LA Volume (2C MOD)                 82.1 ml                 LA Volume (BP MOD)               83.90 cm3                 LA Volume Index (BP MOD)       43.47 ml/m2   16.00-34.00   RA Area (4C)                      23.2 cm2        <=18.0    M-mode Measurements  ----------------------------------------------------------------------  Name                                 Value        Normal  ----------------------------------------------------------------------    M-Mode  ----------------------------------------------------------------------  TAPSE                              19.7 mm        >=17.0    LVOT/Aortic Valve Doppler Measurements  ----------------------------------------------------------------------  Name                                 Value        Normal  ----------------------------------------------------------------------    LVOT Doppler  ----------------------------------------------------------------------  LVOT VTI                           43.2 cm                 LVOT Mean Gradient                9.0 mmHg                 LVOT Stroke Volume                  136 ml                   AoV Doppler  ----------------------------------------------------------------------  AV VTI                             64.7 cm                 AV Peak Velocity                304.5 cm/s                 AV Mean Gradient                 22.0 mmHg                 AV Area (Cont Eq Hang)              2.0 cm2                 AV Area Index (Cont Eq Hang)     1.1 cm2/m2                 AV Area (Cont Eq VTI)              2.1 cm2         >=3.0   AV Area Index (Cont Eq VTI)     1.1 cm2/m2                 Dimensionless Index VTI               0.67                   AoV Regurgitation Doppler  ----------------------------------------------------------------------  AR Decel [place]in                 323.0  cm/s2                 AR PHT                              407 ms    Mitral Valve Measurements  ----------------------------------------------------------------------  Name                                 Value        Normal  ----------------------------------------------------------------------    MV Doppler  ----------------------------------------------------------------------  MV E Peak Velocity              141.0 cm/s                 MV A Peak Velocity               79.6 cm/s                 MV E/A                                 1.8                 MV Decel Time                       269 ms          >200   MV Decel Woodson                   525 cm/s2                 MV PHT                               78 ms                 MV Area (PHT)                      2.8 cm2       4.0-6.0     MV Regurgitation Doppler  ----------------------------------------------------------------------  MR ERO (PISA)                     0.17 cm2                 MR Volume (PISA)                     29 ml                   MV Annular TDI  ----------------------------------------------------------------------  MV Septal e' Velocity            11.8 cm/s         >=8.0   MV E/e' (Septal)                      11.9         <=8.0   MV Lateral e' Velocity           11.9 cm/s        >=10.0   MV E/e' (Lateral)                     11.8         <=8.0    Tricuspid Valve Measurements  ----------------------------------------------------------------------  Name                                 Value        Normal  ----------------------------------------------------------------------    TV Regurgitation Doppler  ----------------------------------------------------------------------  TR Peak Velocity                461.0 cm/s                 RA Pressure                      15.0 mmHg         <=5.0   PA Systolic Pressure            100.0 mmHg         <36.0   RV Systolic Pressure            100.0 mmHg         <36.0     TV Annular  TDI  ----------------------------------------------------------------------  TV Lateral Gracie s' Velocity       21.7 cm/s      9.5-18.7    Aorta / Venous Measurements  ----------------------------------------------------------------------  Name                                 Value        Normal  ----------------------------------------------------------------------    IVC/SVC  ----------------------------------------------------------------------  IVC Diameter (Exp 2D)               2.4 cm         <=2.1      Report Signatures  Finalized by Steffanie Corral MD on 2019 03:34 PM   Other Result Information   Interface, Nm[facility]radord[facility] In - 2019  3:35 PM CDT  Interpretation Summary    * The left ventricular systolic function is normal, estimated LVEF 60-65%.    * There is moderate concentric left ventricular hypertrophy.    * Left ventricular wall motion is normal.    * Normal right ventricular systolic function; right ventricle is  quantitatively dilated.    * There is moderate mitral regurgitation.    * There is moderate tricuspid regurgitation.    * There is mild aortic stenosis with a mean gradient of 14 mm Hg; there is  moderate aortic insufficiency.    * Severe pulmonary hypertension, estimated right ventricular systolic  pressure is 100 mmHg.    * Compared to prior study on 2016, no significant change in left  ventricular systolic function with side by side comparison; the degree of MR,  TR are unchanged; estimated PA systolic pressures remains severely elevated  (prior PA pressures of ~93 mm Hg).      Patient Info  Name:     GABRIEL FORBES  Age:     39 years  :     1980  Gender:     Male  MRN:     [id number]  Accession #:     [id number]  Ht:     67 in  Wt:     171 lbs  BSA:     1.93 m2  Systolic BP:     129 mmHg  Diastolic BP:     70 mmHg  Heart Rhythm:     Normal sinus rhythm  Technical Quality:     Diagnostic quality  Exam Date/Time:     2019 1:20 PM  Site Location:     [facility] Echo NMR  Exam  Location:     [facility] Echo [facility]  Patient Status:     Inpatient  Admit Date:     8/24/2019  Exam Type:     ECHOCARDIOGRAM    Staff  Ordering Physician:     WILLIAN CASTAÑEDA  Sonographer:     Antwan CHANCE RDCS    Study Info  Indications      I50.9 - Heart Failure,  Unspecified  Procedure    Complete two-dimensional, color flow and Doppler transthoracic  echocardiogram is performed.    Account #:     [id number]    Reading Physician:     Steffanie Corral MD    Primary Care Provider:     Unknown / None      Findings  Left Ventricle    The left ventricular systolic function is normal, estimated LVEF 60-65%.    The left ventricle is normal size.    Left ventricular wall motion is normal.    There is moderate concentric left ventricular hypertrophy.  Right Ventricle    The right ventricle is quantitatively enlarged.    Normal right ventricular systolic function; right ventricle is  quantitatively dilated.  Diastolic Function/Strain    The left ventricular diastolic function is indeterminate.      Left Atrium    The left atrium is mildly enlarged by quantification.    Right Atrium    The right atrium is enlarged.      Aortic Valve    The aortic valve is trileaflet.    There is mild aortic stenosis with a mean gradient of 14 mm Hg; there is  moderate aortic insufficiency.    Pulmonary Valve    The pulmonic valve is structurally normal.    There is no Doppler evidence of pulmonic valve sclerosis or stenosis.    There is moderate pulmonic regurgitation.    Mitral Valve    The mitral valve leaflets are mildly calcified.    There is no significant mitral stenosis.    There is moderate mitral regurgitation.    Tricuspid Valve    There is no significant tricuspid stenosis.    There is moderate tricuspid regurgitation.      Hemodynamics    The inferior vena cava is dilated (> 2.1 cm), < 50% respiratory variance, RA  pressure elevated at 15 mmHg.    Severe pulmonary hypertension, estimated right ventricular systolic pressure  is 100 mmHg.    Septae     There is no evidence of ASD/PFO by color Doppler; however no bubble study  was performed.    Pericardium/Pleura    There is no pericardial effusion.    Vessels    The aortic measurements are indexed to age and body surface area.    The aortic root is normal in size measuring 3.4 cm.    The proximal ascending aorta is normal in size measuring 3.5 cm.               Name: GABRIEL FORBES  MRN: [id number]  : 1980  Study Date: 2018 10:46 AM  Age: 38 yrs  Gender: Male  Patient Location: [facility] [place]  Reason For Study: Hypertension, ESRD (end stage renal disease), Organ  transplant Candidate  Ordering Physician: ANAMIKA MANCILLA  Referring Physician: ANAMIKA MANCILLA  Performed By: Layla Smith     BSA: 2.0 m2  Height: 67 in  Weight: 187 lb  HR: 75  BP: 150/84 mmHg  _____________________________________________________________________________  __        Procedure  Echocardiogram with two-dimensional, color and spectral Doppler performed.  _____________________________________________________________________________  __        Interpretation Summary  Global and regional left ventricular function is normal with an EF of 55-60%.  Moderate mitral insufficiency is present. Etiology is unclear, possibly with  restricted posterior leaflet.  Global right ventricular function is normal.  Moderate tricuspid insufficiency is present.  Moderate pulmonary hypertension is present.  No pericardial effusion is present.  Dilation of the inferior vena cava is present with abnormal respiratory  variation in diameter.  No significant change form prior    Catherization          I spent 30 minutes reviewing chart, discussing the patient's condition and preparing report    We reviewed and suggested:  5. Viral epidemic safety suggestions  6. Continued adherence to medical regimen, diet, and exercise program as previously described  7. Current scheduled visit deferred in light of current situation  8. Call immediately should your health  status change, and we will make arrangements to see you immediately

## 2020-04-09 ENCOUNTER — DOCUMENTATION ONLY (OUTPATIENT)
Dept: TRANSPLANT | Facility: CLINIC | Age: 40
End: 2020-04-09

## 2020-04-09 ENCOUNTER — DOCUMENTATION ONLY (OUTPATIENT)
Dept: NEPHROLOGY | Facility: CLINIC | Age: 40
End: 2020-04-09

## 2020-04-09 DIAGNOSIS — Z76.82 AWAITING ORGAN TRANSPLANT: ICD-10-CM

## 2020-04-09 DIAGNOSIS — N18.6 ESRD (END STAGE RENAL DISEASE) (H): ICD-10-CM

## 2020-04-09 NOTE — PROGRESS NOTES
Passed along recommendations from cardiology to patient's dialysis unit- pulm HTN with ascites likely 2/2 volume overload. HD will work to reduce EDW. Patient will need repeat RHC in the future.

## 2020-04-09 NOTE — PROGRESS NOTES
Faxed recent Virtual Visit Note from Dr. Roberto Calles dated 04/14/2020  to Serafin Nobles DO [facility] [place].,  Received [place] confirmation.

## 2020-04-14 ENCOUNTER — VIRTUAL VISIT (OUTPATIENT)
Dept: CARDIOLOGY | Facility: CLINIC | Age: 40
End: 2020-04-14
Payer: COMMERCIAL

## 2020-04-14 DIAGNOSIS — I27.20 PULMONARY HYPERTENSION (H): ICD-10-CM

## 2020-04-14 PROCEDURE — 99214 OFFICE O/P EST MOD 30 MIN: CPT | Mod: 95 | Performed by: INTERNAL MEDICINE

## 2020-07-09 ENCOUNTER — RESULTS ONLY (OUTPATIENT)
Dept: OTHER | Facility: CLINIC | Age: 40
End: 2020-07-09

## 2020-10-27 DIAGNOSIS — Z76.82 AWAITING ORGAN TRANSPLANT: ICD-10-CM

## 2020-10-27 DIAGNOSIS — N18.6 ESRD (END STAGE RENAL DISEASE) (H): ICD-10-CM

## 2020-11-07 ENCOUNTER — HEALTH MAINTENANCE LETTER (OUTPATIENT)
Age: 40
End: 2020-11-07

## 2021-01-29 ENCOUNTER — APPOINTMENT (OUTPATIENT)
Dept: LAB | Facility: CLINIC | Age: 41
End: 2021-01-29
Attending: TRANSPLANT SURGERY
Payer: COMMERCIAL

## 2021-03-15 ENCOUNTER — TRANSFERRED RECORDS (OUTPATIENT)
Dept: HEALTH INFORMATION MANAGEMENT | Facility: CLINIC | Age: 41
End: 2021-03-15
Payer: COMMERCIAL

## 2021-03-19 ENCOUNTER — MEDICAL CORRESPONDENCE (OUTPATIENT)
Dept: HEALTH INFORMATION MANAGEMENT | Facility: CLINIC | Age: 41
End: 2021-03-19

## 2021-03-19 ENCOUNTER — TRANSFERRED RECORDS (OUTPATIENT)
Dept: HEALTH INFORMATION MANAGEMENT | Facility: CLINIC | Age: 41
End: 2021-03-19

## 2021-04-09 ENCOUNTER — OFFICE VISIT (OUTPATIENT)
Dept: ORTHOPEDICS | Facility: CLINIC | Age: 41
End: 2021-04-09
Payer: COMMERCIAL

## 2021-04-09 VITALS — BODY MASS INDEX: 30.29 KG/M2 | HEIGHT: 67 IN | WEIGHT: 193 LBS

## 2021-04-09 DIAGNOSIS — E83.39 HYPERPHOSPHATEMIA: ICD-10-CM

## 2021-04-09 DIAGNOSIS — E83.59 TUMORAL CALCINOSIS: Primary | ICD-10-CM

## 2021-04-09 PROCEDURE — 99204 OFFICE O/P NEW MOD 45 MIN: CPT | Mod: GC | Performed by: ORTHOPAEDIC SURGERY

## 2021-04-09 RX ORDER — SEVELAMER HYDROCHLORIDE 400 MG/1
400 TABLET, FILM COATED ORAL
Qty: 90 TABLET | Refills: 4 | Status: SHIPPED | OUTPATIENT
Start: 2021-04-09 | End: 2021-12-01

## 2021-04-09 ASSESSMENT — MIFFLIN-ST. JEOR: SCORE: 1741.68

## 2021-04-09 NOTE — PROGRESS NOTES
CHIEF CONCERN: Left hip mass    HISTORY OF PRESENT ILLNESS: Jonatan is a 40-year-old male past medical history of birth with a unilateral kidney which subsequently failed, status post renal transplant with failure 10 years ago on dialysis for the past 10 years, congestive heart failure and pseudotumor cerebri who comes in for evaluation of left hip mass.  Patient reports that he began palpating the mass approximately 5 to 6 months ago.  Initially it was not painful but now it is tender when he lies on it or pushes on it with his hand.  There is been no bruising or skin breakdown over the tumor.  He feels that his been growing steadily over the past few months.  He comes in today with concern that it could be a cancerous mass.    Of note patient has had a significant increase in his weight as of lately due to worsening congestive heart failure with significant edema in lower extremities.    Past Medical History:   Diagnosis Date     Atrial fibrillation (H)      Congenital solitary kidney      ESRD (end stage renal disease) on dialysis (H)      History of blood transfusion      History of myocardial infarction 08/08/2006     HTN (hypertension)      Kidney replaced by transplant 2007    living donor transplant     Pseudotumor cerebri      Pulmonary hypertension (H)      S/P  shunt      Shingles November 2014       Past Surgical History:   Procedure Laterality Date     HC LEFT HEART CATHETERIZATION  08/08/2006    normal coronaries, cardiomegaly, pericardial tamponade     TRANSPLANT KIDNEY RECIPIENT LIVING UNRELATED  2007    [facility]     VENTRICLE SHUNT, ABDOMEN  1997    UMN - Related to Pseudotumor cerebri       Current Outpatient Medications   Medication Sig Dispense Refill     lisinopril (PRINIVIL,ZESTRIL) 40 MG tablet Take 40 mg by mouth       METOPROLOL TARTRATE 100 MG PO TABS ONE TABLET TWICE DAILY  3     minoxidil (LONITEN) 2.5 MG tablet Take 2.5 mg by mouth 2 times daily       NIFEdipine (ADALAT CC) 90 MG TB24  Take 90 mg by mouth daily         Allergies   Allergen Reactions     Tramadol Other (See Comments) and Anaphylaxis     Due to kidney transplant     Codeine Hives     Ibu [Nsaids] Other (See Comments)     Due to kidney transplant     Morphine GI Disturbance       SOCIAL HISTORY:    Patient lives with his significant other in [facility].  He is on disability.  He has been on dialysis for 10 years.  He does not smoke.    FAMILY HISTORY: Reviewed in EMR      REVIEW OF SYSTEMS: Positive for that noted in past medical history and history of present illness and otherwise reviewed in EMR     PHYSICAL EXAM:    General physical examination reveals a 40-year-old male communicates with normal affect.  There is significant muscle wasting of his upper extremities.  His respirations are nonlabored on room air.     General physical examination reveals easily palpable mass in the left hip which is made worse with contraction of his gluteal muscles.  The mass is tender to palpation.  It feels relatively firm.  There is minimal translation of the mass with palpation.  Distally the patient has extensive pitting edema of the lower extremities.  Otherwise neurovascularly intact    IMAGING:  X-ray of pelvis from 2/2/2021 was independently reviewed by me.  There is significant calcific mass lateral to the greater trochanter of the left femur.  No other abnormalities are seen.    MRI of pelvis from outside facility obtained 3/5/2021 was independently reviewed by me.  There is a heterogeneous mass with fluid fluid levels without extensive soft tissue extension measuring 35 mm x 40 mm in the subcutaneous tissue lateral to the gluteus duong.    ASSESSMENT:    40-year-old male with past medical history significant for dialysis for 10 years and active congestive heart failure with new left hip mass seen on MRI.    Patient's history of dialysis, coupled with increased phosphate on lab work and the appearance of the mass on MRI are  consistent with tumoral calcinosis.  When extensive conversation with the patient regarding treatment of this mass.  We discussed that surgical intervention should be a last option due to his medical comorbidities, concern for increased risk of wound complication, and history of frequent recurrence with excision of these masses.  Recommend medical treatment with phosphate binding medications.  Patient is in agreement with this plan    PLAN:  -Prescription of renagel (sevelaner) to decrease patient's phosphate levels  -Recommend that patient have his phosphate level followed during dialysis  -Surgery will be last treatment option, see above    -Patient will follow up with Dr. White on an as-needed basis    This patient was seen and examined by Dr. Mani White MD who is in agreement with assessment and plan.    ITALIA Butt MD  PGY-4, Orthopaedic Surgery

## 2021-04-09 NOTE — LETTER
4/9/2021         RE: Jonatan Foy  [facility] [address]        Dear Colleague,    Thank you for referring your patient, Jonatan Foy, to the [facility] [place]. Please see a copy of my visit note below.    CHIEF CONCERN: Left hip mass    HISTORY OF PRESENT ILLNESS: Jonatan is a 40-year-old male past medical history of birth with a unilateral kidney which subsequently failed, status post renal transplant with failure 10 years ago on dialysis for the past 10 years, congestive heart failure and pseudotumor cerebri who comes in for evaluation of left hip mass.  Patient reports that he began palpating the mass approximately 5 to 6 months ago.  Initially it was not painful but now it is tender when he lies on it or pushes on it with his hand.  There is been no bruising or skin breakdown over the tumor.  He feels that his been growing steadily over the past few months.  He comes in today with concern that it could be a cancerous mass.    Of note patient has had a significant increase in his weight as of lately due to worsening congestive heart failure with significant edema in lower extremities.    Past Medical History:   Diagnosis Date     Atrial fibrillation (H)      Congenital solitary kidney      ESRD (end stage renal disease) on dialysis (H)      History of blood transfusion      History of myocardial infarction 08/08/2006     HTN (hypertension)      Kidney replaced by transplant 2007    living donor transplant     Pseudotumor cerebri      Pulmonary hypertension (H)      S/P  shunt      Shingles November 2014       Past Surgical History:   Procedure Laterality Date     HC LEFT HEART CATHETERIZATION  08/08/2006    normal coronaries, cardiomegaly, pericardial tamponade     TRANSPLANT KIDNEY RECIPIENT LIVING UNRELATED  2007    [facility]     VENTRICLE SHUNT, ABDOMEN  1997    [facility]N - Related to Pseudotumor cerebri       Current Outpatient Medications   Medication Sig  Dispense Refill     lisinopril (PRINIVIL,ZESTRIL) 40 MG tablet Take 40 mg by mouth       METOPROLOL TARTRATE 100 MG PO TABS ONE TABLET TWICE DAILY  3     minoxidil (LONITEN) 2.5 MG tablet Take 2.5 mg by mouth 2 times daily       NIFEdipine (ADALAT CC) 90 MG TB24 Take 90 mg by mouth daily         Allergies   Allergen Reactions     Tramadol Other (See Comments) and Anaphylaxis     Due to kidney transplant     Codeine Hives     Ibu [Nsaids] Other (See Comments)     Due to kidney transplant     Morphine GI Disturbance       SOCIAL HISTORY:    Patient lives with his significant other in [place].  He is on disability.  He has been on dialysis for 10 years.  He does not smoke.    FAMILY HISTORY: Reviewed in EMR      REVIEW OF SYSTEMS: Positive for that noted in past medical history and history of present illness and otherwise reviewed in EMR     PHYSICAL EXAM:    General physical examination reveals a 40-year-old male communicates with normal affect.  There is significant muscle wasting of his upper extremities.  His respirations are nonlabored on room air.     General physical examination reveals easily palpable mass in the left hip which is made worse with contraction of his gluteal muscles.  The mass is tender to palpation.  It feels relatively firm.  There is minimal translation of the mass with palpation.  Distally the patient has extensive pitting edema of the lower extremities.  Otherwise neurovascularly intact    IMAGING:  X-ray of pelvis from 2/2/2021 was independently reviewed by me.  There is significant calcific mass lateral to the greater trochanter of the left femur.  No other abnormalities are seen.    MRI of pelvis from outside facility obtained 3/5/2021 was independently reviewed by me.  There is a heterogeneous mass with fluid fluid levels without extensive soft tissue extension measuring 35 mm x 40 mm in the subcutaneous tissue lateral to the gluteus duong.    ASSESSMENT:    40-year-old male  with past medical history significant for dialysis for 10 years and active congestive heart failure with new left hip mass seen on MRI.    Patient's history of dialysis, coupled with increased phosphate on lab work and the appearance of the mass on MRI are consistent with tumoral calcinosis.  When extensive conversation with the patient regarding treatment of this mass.  We discussed that surgical intervention should be a last option due to his medical comorbidities, concern for increased risk of wound complication, and history of frequent recurrence with excision of these masses.  Recommend medical treatment with phosphate binding medications.  Patient is in agreement with this plan    PLAN:  -Prescription of renagel (sevelaner) to decrease patient's phosphate levels  -Recommend that patient have his phosphate level followed during dialysis  -Surgery will be last treatment option, see above    -Patient will follow up with Dr. White on an as-needed basis    This patient was seen and examined by Dr. Mani White MD who is in agreement with assessment and plan.    ITALIA Butt MD  PGY-4, Orthopaedic Surgery          I was present with the resident during the history and exam.  I discussed the case with the resident and agree with the findings as documented in the assessment and plan.

## 2021-06-01 ENCOUNTER — RECORDS - HEALTHEAST (OUTPATIENT)
Dept: ADMINISTRATIVE | Facility: CLINIC | Age: 41
End: 2021-06-01

## 2021-07-20 ENCOUNTER — TRANSFERRED RECORDS (OUTPATIENT)
Dept: HEALTH INFORMATION MANAGEMENT | Facility: CLINIC | Age: 41
End: 2021-07-20

## 2021-07-27 ENCOUNTER — TELEPHONE (OUTPATIENT)
Dept: TRANSPLANT | Facility: CLINIC | Age: 41
End: 2021-07-27

## 2021-07-27 NOTE — TELEPHONE ENCOUNTER
Coordinator called pt to see if he was still interested in transplant. Will order neph & SW return waitlist appointments. Reviewed there will be other items needed for active status consideration but advised pt to start out with these appointments first. Pt agrees with plan and has no there questions at this time.

## 2021-07-28 ENCOUNTER — DOCUMENTATION ONLY (OUTPATIENT)
Dept: TRANSPLANT | Facility: CLINIC | Age: 41
End: 2021-07-28

## 2021-07-28 DIAGNOSIS — Z76.82 ORGAN TRANSPLANT CANDIDATE: ICD-10-CM

## 2021-07-28 DIAGNOSIS — N18.6 ESRD (END STAGE RENAL DISEASE) (H): ICD-10-CM

## 2021-08-03 ENCOUNTER — VIRTUAL VISIT (OUTPATIENT)
Dept: NEPHROLOGY | Facility: CLINIC | Age: 41
End: 2021-08-03
Attending: INTERNAL MEDICINE
Payer: COMMERCIAL

## 2021-08-03 ENCOUNTER — ALLIED HEALTH/NURSE VISIT (OUTPATIENT)
Dept: TRANSPLANT | Facility: CLINIC | Age: 41
End: 2021-08-03
Attending: INTERNAL MEDICINE
Payer: COMMERCIAL

## 2021-08-03 DIAGNOSIS — N18.6 ESRD (END STAGE RENAL DISEASE) (H): Primary | ICD-10-CM

## 2021-08-03 DIAGNOSIS — Z76.82 ORGAN TRANSPLANT CANDIDATE: ICD-10-CM

## 2021-08-03 DIAGNOSIS — N18.6 ESRD (END STAGE RENAL DISEASE) (H): ICD-10-CM

## 2021-08-03 NOTE — LETTER
8/3/2021       RE: Jonatan Foy  [facility] [address]     Dear Colleague,    Thank you for referring your patient, Jonatan Foy, to the [facility]. Please see a copy of my visit note below.    Unable to reach patient for transplant wait list visit. Will need to be rescheudled.       Again, thank you for allowing me to participate in the care of your patient.      Sincerely,    GEORGINA Joiner CNP

## 2021-08-03 NOTE — PROGRESS NOTES
Jonatan is a 40 year old who is being evaluated via a billable telephone visit.      What phone number would you like to be contacted at? [phone]  How would you like to obtain your AVS? Mail a copy  Phone call duration: *** minutes

## 2021-08-03 NOTE — PROGRESS NOTES
Attempted to reach pt 3x for social work transplant wait list appointment. Pt did not answer. Left message for pt to return this writer's call. Updated transplant coordinator.     [facility]an [facility]    Kidney/Pancreas/Auto Islet Transplant Programs

## 2021-08-09 ENCOUNTER — ALLIED HEALTH/NURSE VISIT (OUTPATIENT)
Dept: TRANSPLANT | Facility: CLINIC | Age: 41
End: 2021-08-09
Attending: INTERNAL MEDICINE
Payer: COMMERCIAL

## 2021-08-09 DIAGNOSIS — Z76.82 AWAITING ORGAN TRANSPLANT: Primary | ICD-10-CM

## 2021-08-09 NOTE — PROGRESS NOTES
"Attempted to reach pt 2x for wait list appointment. Pt's outgoing phone message stated \"the person you are trying to reach cannot take phone calls at this time\" and then the phone rang without any answer or voicemail.     Lyly Kiran, [facility]    Kidney/Pancreas/Auto Islet Transplant Programs    "

## 2021-09-05 ENCOUNTER — HEALTH MAINTENANCE LETTER (OUTPATIENT)
Age: 41
End: 2021-09-05

## 2021-10-15 ENCOUNTER — TELEPHONE (OUTPATIENT)
Dept: TRANSPLANT | Facility: CLINIC | Age: 41
End: 2021-10-15
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
## 2021-11-29 DIAGNOSIS — E83.59 TUMORAL CALCINOSIS: ICD-10-CM

## 2021-11-29 DIAGNOSIS — E83.39 HYPERPHOSPHATEMIA: ICD-10-CM

## 2021-12-01 ENCOUNTER — TELEPHONE (OUTPATIENT)
Dept: ORTHOPEDICS | Facility: CLINIC | Age: 41
End: 2021-12-01
Payer: COMMERCIAL

## 2021-12-01 RX ORDER — SEVELAMER HYDROCHLORIDE 400 MG/1
TABLET, FILM COATED ORAL
Qty: 270 TABLET | Refills: 1 | Status: SHIPPED | OUTPATIENT
Start: 2021-12-01

## 2021-12-01 NOTE — TELEPHONE ENCOUNTER
RN called and spoke with Jonatan.  He is informed that his medication was refilled, however, going forward we would like your transplant or kidney people to handle it.  Jonatan states that he is confused and did not request this medication or remember it.  He is not familiar with it.      Approved Prescriptions     sevelamer HCl (RENAGEL) 400 MG tablet         Sig: TAKE 1 TABLET BY MOUTH 3 TIMES DAILY WITH MEALS    Disp:  270 tablet    Refills:  1    Start: 12/1/2021    Class: E-Prescribe    Authorized by: Mani White MD    For: Tumoral calcinosis, Hyperphosphatemia        To be filled at: [facility] [id number] [place] [facility] [place], [place] - [address] [facility]

## 2021-12-16 ENCOUNTER — VIRTUAL VISIT (OUTPATIENT)
Dept: TRANSPLANT | Facility: CLINIC | Age: 41
End: 2021-12-16
Attending: NURSE PRACTITIONER
Payer: COMMERCIAL

## 2021-12-16 ENCOUNTER — VIRTUAL VISIT (OUTPATIENT)
Dept: NEPHROLOGY | Facility: CLINIC | Age: 41
End: 2021-12-16
Attending: NURSE PRACTITIONER
Payer: COMMERCIAL

## 2021-12-16 DIAGNOSIS — Q60.0 CONGENITAL SOLITARY KIDNEY: ICD-10-CM

## 2021-12-16 DIAGNOSIS — Z99.2 ESRD (END STAGE RENAL DISEASE) ON DIALYSIS (H): Primary | ICD-10-CM

## 2021-12-16 DIAGNOSIS — N18.6 ESRD (END STAGE RENAL DISEASE) ON DIALYSIS (H): Primary | ICD-10-CM

## 2021-12-16 DIAGNOSIS — Z76.82 AWAITING ORGAN TRANSPLANT: Primary | ICD-10-CM

## 2021-12-16 PROCEDURE — 99207 PR NO CHARGE COORDINATED CARE PS: CPT | Performed by: SOCIAL WORKER

## 2021-12-16 NOTE — LETTER
12/16/2021       RE: Jonatan Foy  [facility] [facility] [address]     Dear Colleague,    Thank you for referring your patient, Jonatan Foy, to the [facility]. Please see a copy of my visit note below.    Patient was unable to be reached for wait list visit and will need to be rescheduled.           Again, thank you for allowing me to participate in the care of your patient.      Sincerely,    GEORGINA Joiner CNP

## 2021-12-16 NOTE — LETTER
Date:December 20, 2021      Patient was self referred, no letter generated. Do not send.        [facility] Health Information

## 2021-12-16 NOTE — PROGRESS NOTES
Patient Name: Jonatan Foy  : 1980  Age: 41 year old  MRN: [id number]  Date of Initial Social Work Evaluation: 2015, 2018    Patient on transplant wait list.  Saw today to update psychosocial assessment.      Presenting Information   Living Situation: Jonatan resides in a house with his daughter Shweta (18), ex wife Renetta, her  Renato and their two children in [place], [place].  If not local, plans for short term stay:  NA  Previous Functional Status: independent- utilizes medical transportation for dialysis ([facility] [employer] [facility]). Jonatan noted issues with reliance on the transportation provided by [place]. SW noted to also consider metro mobility (will likely qualify with SSDI status).   Cultural/Language/Spiritual Considerations: English speaking     Support System  Primary Support Person Pt's ex spouse Renetta and her  Renato. Renetta works during the night, Renato works during the day so Jonatan will not be alone.  Other support:  Shweta is 18 now and noted she is available to support her dad post transplant.  Plan for support in immediate post-transplant period: individuals in the household     Health Care Directive  Decision Maker: Patient   Alternate Decision Maker: Pt's father   Health Care Directive: Provided education and Declined completing    Mental Health/Coping:   History of Mental Health: none   History of Chemical Health: none   Current status: Jonatan noted that he feels well overall   Coping: Jonatan enjoys spending time with family   Services Needed/Recommended: none identified     Financial   Income: SSDI  Impact of transplant on income: none   Insurance and medication coverage: Medicare and [employer] ADAM Cheung also has an AARP plan  Financial concerns: none identified   Resources needed: none identified     Assessment and recommendations and plan:  Reviewed transplant education (Medicare, rehabilitation, donor issues, community/financial resources, and psych/family adjustment) as  well as psychosocial risks of transplant. Provided patient with a copy of post-transplant informational sheet that includes information on potential costs of medications, Medicare ESRD, post-transplant lodging, etc. Patient seemed to process information well. Appeared well informed, motivated, and able to follow post transplant requirements. Behavior was appropriate during interview. Has adequate income and insurance coverage. Adequate social support. No major contraindications noted for transplant. At this time, patient appears to understand the risks and benefits of transplant.     Renetta [facility]  Outpatient Kidney/Pancreas/Auto Islet Transplant Program   [address] [place] [id number]  [email or web address]  Office: [phone] I Fax: [phone]

## 2021-12-16 NOTE — LETTER
2021         RE: Jonatan Foy  [address]  [facility] [address]        Dear Colleague,    Thank you for referring your patient, Jonatan Foy, to the [facility]PL[facility]. Please see a copy of my visit note below.    Patient Name: Jonatan Foy  : 1980  Age: 41 year old  MRN: [id number]  Date of Initial Social Work Evaluation: 2015, 2018    Patient on transplant wait list.  Saw today to update psychosocial assessment.      Presenting Information   Living Situation: Jonatan resides in a house with his daughter Shweta (18), ex wife Renetta, her  Renato and their two children in [place], [place].  If not local, plans for short term stay:  NA  Previous Functional Status: independent- utilizes medical transportation for dialysis ([employer] [facility]). Jonatan noted issues with reliance on the transportation provided by [place]. SW noted to also consider metro mobility (will likely qualify with SSDI status).   Cultural/Language/Spiritual Considerations: English speaking     Support System  Primary Support Person Pt's ex spouse Renetta and her  Renato. Renetta works during the night, Renato works during the day so Jonatan will not be alone.  Other support:  Shweta is 18 now and noted she is available to support her dad post transplant.  Plan for support in immediate post-transplant period: individuals in the household     Health Care Directive  Decision Maker: Patient   Alternate Decision Maker: Pt's father   Health Care Directive: Provided education and Declined completing    Mental Health/Coping:   History of Mental Health: none   History of Chemical Health: none   Current status: Jonatan noted that he feels well overall   Coping: Jonatan enjoys spending time with family   Services Needed/Recommended: none identified     Financial   Income: SSDI  Impact of transplant on income: none   Insurance and medication coverage: Medicare and are MAEddie Cheung also has an AARP  plan  Financial concerns: none identified   Resources needed: none identified     Assessment and recommendations and plan:  Reviewed transplant education (Medicare, rehabilitation, donor issues, community/financial resources, and psych/family adjustment) as well as psychosocial risks of transplant. Provided patient with a copy of post-transplant informational sheet that includes information on potential costs of medications, Medicare ESRD, post-transplant lodging, etc. Patient seemed to process information well. Appeared well informed, motivated, and able to follow post transplant requirements. Behavior was appropriate during interview. Has adequate income and insurance coverage. Adequate social support. No major contraindications noted for transplant. At this time, patient appears to understand the risks and benefits of transplant.     Renetta Benavides   [facility]  Outpatient Kidney/Pancreas/Auto Islet Transplant Program   [address] [facility] [place] [id number]  [email or web address]  Office: [phone] I Fax: [phone]      Again, thank you for allowing me to participate in the care of your patient.        Sincerely,        FAY CannonSW

## 2021-12-16 NOTE — LETTER
Date:December 20, 2021      Patient was self referred, no letter generated. Do not send.        [facility] Health Information

## 2021-12-26 ENCOUNTER — HEALTH MAINTENANCE LETTER (OUTPATIENT)
Age: 41
End: 2021-12-26

## 2022-01-24 ENCOUNTER — TELEPHONE (OUTPATIENT)
Dept: TRANSPLANT | Facility: CLINIC | Age: 42
End: 2022-01-24
Payer: COMMERCIAL

## 2022-01-24 DIAGNOSIS — Z76.82 ORGAN TRANSPLANT CANDIDATE: ICD-10-CM

## 2022-01-24 DIAGNOSIS — N18.6 ESRD (END STAGE RENAL DISEASE) (H): ICD-10-CM

## 2022-01-24 NOTE — TELEPHONE ENCOUNTER
Called pt to discuss no showing last appt, pt states that his phone was off. He would rather have in person visits. Will message scheduling to have him set up for in person visit with neph and social work Pt verbalized understanding of information and has no further questions. Encouraged to reach out if questions arise.

## 2022-02-20 ENCOUNTER — HEALTH MAINTENANCE LETTER (OUTPATIENT)
Age: 42
End: 2022-02-20

## 2022-02-28 ENCOUNTER — TELEPHONE (OUTPATIENT)
Dept: TRANSPLANT | Facility: CLINIC | Age: 42
End: 2022-02-28
Payer: COMMERCIAL

## 2022-02-28 NOTE — TELEPHONE ENCOUNTER
Pt no showed appts today again. Called pt dialysis center for attendance records and also put in a message to Dr Nobles pt's nephrologist to discuss candidacy based on compliance. Left direct line for return call. Will bring to committee to discuss once Dr Nobles gets in touch with [facility].

## 2022-03-01 ENCOUNTER — DOCUMENTATION ONLY (OUTPATIENT)
Dept: TRANSPLANT | Facility: CLINIC | Age: 42
End: 2022-03-01

## 2022-03-29 ENCOUNTER — TELEPHONE (OUTPATIENT)
Dept: TRANSPLANT | Facility: CLINIC | Age: 42
End: 2022-03-29
Payer: COMMERCIAL

## 2022-03-29 NOTE — TELEPHONE ENCOUNTER
Spoke with pts primary nephrologist Dr. Nobles regarding pts transplant candidacy as he as no showed multiple appts with our center. Dr. Nobles agrees that compliance is a concern, he will no show for dialysis runs at times and cut runs short. Dr. Nobles also has some medical concerns in regards to pts transplant candidacy. Pt is consistently volume overloaded, he usually is 11kg over dry weight. Has cirrhosis and gets routine paracentesis. Pt is lethargic intermittently at HD runs, at times unable to hold conversation. He is also on Methadone chronically.

## 2022-03-29 NOTE — TELEPHONE ENCOUNTER
Called [facility] for update on pts cirrhosis and paracentesis frequency. Requested records be faxed to our clinic. Left message with Kenzie, care coordinator with [facility] NP, for call back with update.

## 2022-04-25 ENCOUNTER — TELEPHONE (OUTPATIENT)
Dept: TRANSPLANT | Facility: CLINIC | Age: 42
End: 2022-04-25

## 2022-04-25 NOTE — TELEPHONE ENCOUNTER
General  Route to LPN    Reason for call: Patient called to reschedule appointment for today 04/25. Patient states it can be rescheduled for any time/day except for mornings on Tuesday's and Thursday's.       Call back needed? Yes  Return Call Needed  Same as documented in contacts section  When to return call?: Same day: Route High Priority

## 2022-04-27 ENCOUNTER — TELEPHONE (OUTPATIENT)
Dept: TRANSPLANT | Facility: CLINIC | Age: 42
End: 2022-04-27
Payer: COMMERCIAL

## 2022-04-27 ENCOUNTER — COMMITTEE REVIEW (OUTPATIENT)
Dept: TRANSPLANT | Facility: CLINIC | Age: 42
End: 2022-04-27
Payer: COMMERCIAL

## 2022-04-27 NOTE — COMMITTEE REVIEW
Abdominal Committee Review Note     Evaluation Date: 2015  Committee Review Date: 2022    Organ being evaluated for: Kidney    Transplant Phase: Waitlist  Transplant Status: Inactive    Transplant Coordinator: Renetta Olson  Transplant Surgeon:   [place] Moyer    Referring Physician: Vicky Sherman    Primary Diagnosis: Other, Specify - congenital solitary kidney  Secondary Diagnosis:     Committee Review Members:  Akash, Mariam Azevedo RD   Nephrology Kin Au MD, Jose Peterson, APRN CNP, Geoffrey Judge MD, Christ Purcell MD   Pharmacy David [facility], [facility]    - Clinical [facility]oma John, [facility]   Transplant Zakia Sal PA-C, Shavonne Jordan, RN, Sherly Bean, RN, Rose Marie Meredith, RN, Renetta Olson, RN, Tamiko Fry, DAMIEN, Joceline Mcgarry, RN   Transplant Surgery Howie Samuels MD       Transplant Eligibility: Irreversible chronic kidney disease treated w/dialysis or expected need for dialysis    Committee Review Decision: Remove     Committee Chair Howie Samuels MD verbally attested to the committee's decision.    Committee Discussion Details: reviewed the followin. Noncompliance- Pt has no showed multiple appts with our center. I spoke with his primary nephrologist Dr. Nobles who agrees romario compliance is a concern, He no shows at dialysis sometimes and will cut runs short. He also had some medical concerns including pt being consistently volume overloaded, generally 11kg over dry weight, he has cirrhosis and gets routine paracentesis through [facility]. Pt has a history of prior kidney transplant being lost due to non compliance.     Committee determined pt is no longer a candidate through our center due to noncompliance. Pt may come back for reevaluation if he has support from primary nephrologist with referral and has shown good compliance.

## 2022-04-27 NOTE — TELEPHONE ENCOUNTER
Called pt to discuss committee outcome. Pt was determined to no longer be a candidate due to non compliance. Pt has no showed multiple appts through our center, misses dialysis and cuts runs short. Discussed that pt can be reevaluated through our center if he shows good compliance with dialysis and making other medical appts as well as. He will need a referral with the support of his primary nephrologist. Pt may want to get worked up through another center and will let [facility] know if he chooses to do so. Will keep on list to possibly transfer wait time.

## 2022-04-27 NOTE — TELEPHONE ENCOUNTER
Called Kenzie with [facility] for update on pt status. Left  with direct line for return call.     Received call back from Kenzie with [facility]. The last paracentesis pt had per their records was 2/1/22 and over 4450mL were removed. Pt was supposed to have a follow up visit after and no showed that appt. Last office visit was in March of 2021, plan for pt at that time was for pt to call when he needed a paracentesis and follow up in 1 year. Provided Kenzie with clinic fax number to fax most recent visit notes, [phone]. Kenzie can be reached at [phone] extension [phone] if further questions arise.

## 2022-04-27 NOTE — TELEPHONE ENCOUNTER
Called pts HD center. Pt has missed 3 dialysis treatments in the past month, improving on not cutting runs short. He is still very fluid overloaded and not near his dry weight. RN reports pt receives para's about monthly or every other month.

## 2022-07-26 ENCOUNTER — TELEPHONE (OUTPATIENT)
Dept: TRANSPLANT | Facility: CLINIC | Age: 42
End: 2022-07-26

## 2022-07-26 NOTE — TELEPHONE ENCOUNTER
Provider Call: General  Route to LPN    Reason for call: Pt transferred his dialysis from [place] to [facility]  in [place]- 5/23/22  Moved -living with family   Charles is following pt I read him April 2022 note  Would nlike call to review and update their records     Call back needed? Yes    Return Call Needed  Same as documented in contacts section  When to return call?: Greater than one day: Route standard priority     7/28/22 addendum: Returned Ashleigh call. Pt was determined to no longer be a candidate through our center in April 2022 due to non compliance. Charles would like letter sent to HD center. [facility] will have letter sent to new HD center.

## 2022-07-28 ENCOUNTER — DOCUMENTATION ONLY (OUTPATIENT)
Dept: TRANSPLANT | Facility: CLINIC | Age: 42
End: 2022-07-28

## 2022-10-23 ENCOUNTER — HEALTH MAINTENANCE LETTER (OUTPATIENT)
Age: 42
End: 2022-10-23

## 2023-04-02 ENCOUNTER — HEALTH MAINTENANCE LETTER (OUTPATIENT)
Age: 43
End: 2023-04-02

## 2023-08-31 ENCOUNTER — TELEPHONE (OUTPATIENT)
Dept: TRANSPLANT | Facility: CLINIC | Age: 43
End: 2023-08-31
Payer: COMMERCIAL

## 2023-08-31 NOTE — TELEPHONE ENCOUNTER
Called pt to check in if he is working with other transplant center. Pt reports he is working with his nephrologist to get new referral for [facility]. Will update [facility] if listed.     Called dialysis center to check in if pt has been worked up at other centers. Left VM with direct line for return call.

## 2023-11-16 ENCOUNTER — TELEPHONE (OUTPATIENT)
Dept: TRANSPLANT | Facility: CLINIC | Age: 43
End: 2023-11-16
Payer: COMMERCIAL

## 2023-11-16 NOTE — TELEPHONE ENCOUNTER
Called HD center to check in if pt is being worked up at other centers. Left VM with direct line for return call.

## 2023-12-07 ENCOUNTER — TELEPHONE (OUTPATIENT)
Dept: TRANSPLANT | Facility: CLINIC | Age: 43
End: 2023-12-07
Payer: COMMERCIAL

## 2023-12-07 NOTE — TELEPHONE ENCOUNTER
Called pt to check in on if working with other centers. Pt reports he has not been evaluated yet and doesn't know where to start. Reviewed pt needs to work with primary neph for referrals to outside centers. Reviewed pt listing date starts with HD start so will be removed from waitlist as he would not lose any time. Pt verbalized understanding of information and has no further questions. Encouraged to reach out if questions arise.

## 2023-12-08 ENCOUNTER — DOCUMENTATION ONLY (OUTPATIENT)
Dept: TRANSPLANT | Facility: CLINIC | Age: 43
End: 2023-12-08
Payer: COMMERCIAL

## 2024-06-08 ENCOUNTER — HEALTH MAINTENANCE LETTER (OUTPATIENT)
Age: 44
End: 2024-06-08

## 2025-07-07 NOTE — NURSING NOTE
"Reason For Visit:   Chief Complaint   Patient presents with     Consult     consulting left hip mass referred by Dr. Kendrick // noticed mass for about 4 months // pt has some forearm lumps due to dialysis per pt        Ht 1.69 m (5' 6.54\")   Wt 87.5 kg (193 lb)   BMI 30.65 kg/m      Pain Assessment  Patient Currently in Pain: Yes  0-10 Pain Scale: 5  Primary Pain Location: Hip(left)    Caitlyn Boyer ATC    " H&P reviewed. The patient was examined and there are no changes to the H&P.

## (undated) RX ORDER — LIDOCAINE 40 MG/G
CREAM TOPICAL
Status: DISPENSED
Start: 2017-07-19